# Patient Record
Sex: MALE | Race: WHITE | NOT HISPANIC OR LATINO | Employment: FULL TIME | URBAN - NONMETROPOLITAN AREA
[De-identification: names, ages, dates, MRNs, and addresses within clinical notes are randomized per-mention and may not be internally consistent; named-entity substitution may affect disease eponyms.]

---

## 2017-01-06 ENCOUNTER — OFFICE VISIT (OUTPATIENT)
Dept: FAMILY MEDICINE CLINIC | Facility: CLINIC | Age: 32
End: 2017-01-06

## 2017-01-06 VITALS
TEMPERATURE: 98.6 F | HEART RATE: 82 BPM | HEIGHT: 72 IN | WEIGHT: 160 LBS | SYSTOLIC BLOOD PRESSURE: 124 MMHG | OXYGEN SATURATION: 100 % | DIASTOLIC BLOOD PRESSURE: 79 MMHG | BODY MASS INDEX: 21.67 KG/M2

## 2017-01-06 DIAGNOSIS — F39 MOOD DISORDER (HCC): Primary | ICD-10-CM

## 2017-01-06 PROCEDURE — 99213 OFFICE O/P EST LOW 20 MIN: CPT | Performed by: FAMILY MEDICINE

## 2017-01-06 RX ORDER — ESCITALOPRAM OXALATE 10 MG/1
10 TABLET ORAL DAILY
Qty: 30 TABLET | Refills: 5 | Status: ON HOLD | OUTPATIENT
Start: 2017-01-06 | End: 2018-05-21

## 2017-01-06 NOTE — MR AVS SNAPSHOT
Nicholas Oviedo   1/6/2017 9:45 AM   Office Visit    Dept Phone:  [phone]   Encounter #:  [id number]    Provider:  Marlon العراقي DO   Department:  [facility] PRIMARY CARE                Your Full Care Plan              Today's Medication Changes          These changes are accurate as of: 1/6/17 10:30 AM.  If you have any questions, ask your nurse or doctor.               New Medication(s)Ordered:     escitalopram 10 MG tablet   Commonly known as:  LEXAPRO   Take 1 tablet by mouth Daily.   Started by:  Marlon العراقي DO         Stop taking medication(s)listed here:     buPROPion  MG 12 hr tablet   Commonly known as:  WELLBUTRIN SR   Stopped by:  Marlon العراقي DO           cefdinir 300 MG capsule   Commonly known as:  OMNICEF   Stopped by:  Marlon العراقي DO           dexamethasone 0.5 MG tablet   Commonly known as:  DECADRON   Stopped by:  Marlon العراقي DO                Where to Get Your Medications      These medications were sent to [place], [place] - [address] [facility] [phone] [facility] [phone]   [address] [facility] [address]     Phone:  [phone]     escitalopram 10 MG tablet                  Your Updated Medication List          This list is accurate as of: 1/6/17 10:30 AM.  Always use your most recent med list.                escitalopram 10 MG tablet   Commonly known as:  LEXAPRO   Take 1 tablet by mouth Daily.               You Were Diagnosed With        Codes Comments    Mood disorder    -  Primary ICD-10-CM: F39  ICD-9-CM: 296.90       Instructions     None    Patient Instructions History      Upcoming Appointments     Visit Type Date Time Department    OFFICE VISIT 1/6/2017  9:45 AM [facility]      [employer] Signup     [facility] allows you to send messages to your doctor, view your test results, renew your prescriptions, schedule appointments, and more. To sign up, go to  "[email or web address] and click on the Sign Up Now link in the New User? box. Enter your [employer] Activation Code exactly as it appears below along with the last four digits of your Social Security Number and your Date of Birth () to complete the sign-up process. If you do not sign up before the expiration date, you must request a new code.    [employer] Activation Code: [email or web address]  Expires: 2017 10:30 AM    If you have questions, you can email [email or web address] or call [phone] to talk to our [employer] staff. Remember, [employer] is NOT to be used for urgent needs. For medical emergencies, dial 911.               Other Info from Your Visit           Allergies     No Known Allergies      Reason for Visit     Anxiety follow up; wellbutrin did not work      Vital Signs     Blood Pressure Pulse Temperature Height Weight Oxygen Saturation    124/79 (BP Location: Right arm, Patient Position: Sitting) 82 98.6 °F (37 °C) 72\" (182.9 cm) 160 lb (72.6 kg) 100%    Body Mass Index Smoking Status                21.7 kg/m2 Never Smoker          Problems and Diagnoses Noted     Mood disorder      No Longer an Issue     Acute respiratory infection    Cough    Sore throat        "

## 2017-01-06 NOTE — PROGRESS NOTES
Subjective   Nicholas Oviedo is a 31 y.o. male.     Chief Complaint   Patient presents with   • Anxiety     follow up; wellbutrin did not work       History of Present Illness   PT here for sched f/u appt; states no sig improvement in symptoms of mood instability with las med prescribed; no adv effects of med; no SI/HI; continues to eat well; staying as active as he can, as he is currently laid off from work.  The following portions of the patient's history were reviewed and updated as appropriate: allergies, current medications, past family history, past medical history, past social history, past surgical history and problem list.    Review of Systems   Constitutional: Negative for activity change, appetite change, chills, diaphoresis, fatigue, fever and unexpected weight change.   HENT: Negative for congestion, dental problem, drooling, ear discharge, ear pain, facial swelling, hearing loss, mouth sores, nosebleeds, postnasal drip, rhinorrhea, sinus pressure, sneezing, sore throat, tinnitus, trouble swallowing and voice change.    Eyes: Negative for photophobia, pain, discharge, redness, itching and visual disturbance.   Respiratory: Negative for apnea, cough, choking, chest tightness, shortness of breath, wheezing and stridor.    Cardiovascular: Negative for chest pain, palpitations and leg swelling.   Gastrointestinal: Negative for abdominal distention, abdominal pain, anal bleeding, blood in stool, constipation, diarrhea, nausea, rectal pain and vomiting.   Endocrine: Negative for cold intolerance, heat intolerance, polydipsia, polyphagia and polyuria.   Genitourinary: Negative for decreased urine volume, difficulty urinating, dysuria, enuresis, flank pain, frequency, genital sores, hematuria and urgency.   Musculoskeletal: Negative for arthralgias, back pain, gait problem, joint swelling, myalgias, neck pain and neck stiffness.   Skin: Negative for color change, pallor, rash and wound.  "  Allergic/Immunologic: Negative for food allergies and immunocompromised state.   Neurological: Negative for dizziness, tremors, seizures, syncope, facial asymmetry, speech difficulty, weakness, light-headedness, numbness and headaches.   Hematological: Negative for adenopathy. Does not bruise/bleed easily.   Psychiatric/Behavioral: Negative for agitation, behavioral problems, confusion, decreased concentration, dysphoric mood, hallucinations, self-injury, sleep disturbance and suicidal ideas. The patient is not nervous/anxious and is not hyperactive.        Patient Active Problem List   Diagnosis   • Tobacco dependence   • Mood disorder       Current Outpatient Prescriptions on File Prior to Visit   Medication Sig Dispense Refill   • [DISCONTINUED] buPROPion SR (WELLBUTRIN SR) 100 MG 12 hr tablet Take 1 tablet by mouth 2 (Two) Times a Day. 60 tablet 3   • [DISCONTINUED] cefdinir (OMNICEF) 300 MG capsule Take 1 capsule by mouth 2 (Two) Times a Day. 20 capsule 0   • [DISCONTINUED] dexamethasone (DECADRON) 0.5 MG tablet Take 1 tablet by mouth 2 (Two) Times a Day With Meals. 21 tablet 0     No current facility-administered medications on file prior to visit.        Social History     Social History   • Marital status:      Spouse name: N/A   • Number of children: N/A   • Years of education: N/A     Occupational History   • Not on file.     Social History Main Topics   • Smoking status: Never Smoker   • Smokeless tobacco: Current User     Types: Chew   • Alcohol use No   • Drug use: No   • Sexual activity: Not on file     Other Topics Concern   • Not on file     Social History Narrative       Objective   Blood pressure 124/79, pulse 82, temperature 98.6 °F (37 °C), height 72\" (182.9 cm), weight 160 lb (72.6 kg), SpO2 100 %.     Physical Exam   Constitutional: He is oriented to person, place, and time. He appears well-developed and well-nourished. No distress.   HENT:   Head: Normocephalic and atraumatic.   Right " Ear: External ear normal.   Left Ear: External ear normal.   Nose: Nose normal.   Mouth/Throat: Oropharynx is clear and moist. No oropharyngeal exudate.   Eyes: Conjunctivae and EOM are normal. Pupils are equal, round, and reactive to light. Right eye exhibits no discharge. Left eye exhibits no discharge. No scleral icterus.   Neck: Normal range of motion. Neck supple. No JVD present. No tracheal deviation present. No thyromegaly present.   Cardiovascular: Normal rate, normal heart sounds and intact distal pulses.  Exam reveals no gallop and no friction rub.    No murmur heard.  Pulmonary/Chest: Effort normal and breath sounds normal. No stridor. No respiratory distress. He has no wheezes. He has no rales. He exhibits no tenderness.   Abdominal: Soft. Bowel sounds are normal. He exhibits no distension and no mass. There is no tenderness. There is no rebound and no guarding. No hernia.   Genitourinary:   Genitourinary Comments: Pt defers   Musculoskeletal: Normal range of motion. He exhibits no edema, tenderness or deformity.   Lymphadenopathy:     He has no cervical adenopathy.   Neurological: He is alert and oriented to person, place, and time. He has normal reflexes. He displays normal reflexes. No cranial nerve deficit. He exhibits normal muscle tone. Coordination normal.   Skin: Skin is warm. No rash noted. He is not diaphoretic. No erythema. No pallor.   Psychiatric: He has a normal mood and affect. His behavior is normal. Judgment and thought content normal.   Nursing note and vitals reviewed.      Results for orders placed or performed in visit on 11/02/16   POC Rapid Strep A   Result Value Ref Range    Rapid Strep A Screen Negative Negative, VALID, INVALID, Not Performed    Internal Control Passed Passed    Lot Number [id number]     Expiration Date 06/01/2018    POC Influenza A / B   Result Value Ref Range    Rapid Influenza A Ag negative     Rapid Influenza B Ag Negative     Internal Control Passed Passed     Lot Number [id number]     Expiration Date 04/01/2017        Assessment/Plan   Problems Addressed this Visit        Other    Mood disorder - Primary    Relevant Medications    escitalopram (LEXAPRO) 10 MG tablet               Discussion/Summary:  Discussed plan of care in detail with pt today; pt verb understanding and agrees; counseled for approx 15 min of total 25 min exam time.    Discussed need for stress/anxiety reducing techniques such as prayer/meditation/breathing and counting exercises and avoidance of stress producing environments/situations; will follow clinically.    There are no Patient Instructions on file for this visit.

## 2017-01-19 DIAGNOSIS — K64.8 OTHER HEMORRHOIDS: Primary | ICD-10-CM

## 2017-02-13 ENCOUNTER — OFFICE VISIT (OUTPATIENT)
Dept: FAMILY MEDICINE CLINIC | Facility: CLINIC | Age: 32
End: 2017-02-13

## 2017-02-13 ENCOUNTER — HOSPITAL ENCOUNTER (EMERGENCY)
Facility: HOSPITAL | Age: 32
Discharge: LEFT BEFORE DISCHARGE | End: 2017-02-13

## 2017-02-13 VITALS
HEART RATE: 96 BPM | WEIGHT: 155 LBS | BODY MASS INDEX: 20.99 KG/M2 | SYSTOLIC BLOOD PRESSURE: 141 MMHG | HEIGHT: 72 IN | OXYGEN SATURATION: 100 % | DIASTOLIC BLOOD PRESSURE: 76 MMHG | RESPIRATION RATE: 18 BRPM | TEMPERATURE: 99.1 F

## 2017-02-13 VITALS
HEART RATE: 71 BPM | SYSTOLIC BLOOD PRESSURE: 143 MMHG | TEMPERATURE: 99.6 F | BODY MASS INDEX: 21.94 KG/M2 | HEIGHT: 72 IN | OXYGEN SATURATION: 100 % | WEIGHT: 162 LBS | DIASTOLIC BLOOD PRESSURE: 92 MMHG

## 2017-02-13 DIAGNOSIS — J22 ACUTE RESPIRATORY INFECTION: ICD-10-CM

## 2017-02-13 DIAGNOSIS — R05.9 COUGH: Primary | ICD-10-CM

## 2017-02-13 LAB
EXPIRATION DATE: NORMAL
FLUAV AG NPH QL: NORMAL
FLUBV AG NPH QL: NORMAL
INTERNAL CONTROL: NORMAL
Lab: NORMAL

## 2017-02-13 PROCEDURE — 87804 INFLUENZA ASSAY W/OPTIC: CPT | Performed by: FAMILY MEDICINE

## 2017-02-13 PROCEDURE — 96372 THER/PROPH/DIAG INJ SC/IM: CPT | Performed by: FAMILY MEDICINE

## 2017-02-13 PROCEDURE — 99213 OFFICE O/P EST LOW 20 MIN: CPT | Performed by: FAMILY MEDICINE

## 2017-02-13 RX ORDER — CEFDINIR 300 MG/1
300 CAPSULE ORAL 2 TIMES DAILY
Qty: 14 CAPSULE | Refills: 0 | Status: ON HOLD | OUTPATIENT
Start: 2017-02-13 | End: 2018-05-21

## 2017-02-13 RX ORDER — DEXAMETHASONE 1 MG
TABLET ORAL
Qty: 21 TABLET | Refills: 0 | Status: ON HOLD | OUTPATIENT
Start: 2017-02-13 | End: 2018-05-21

## 2017-02-13 RX ORDER — CEFTRIAXONE 1 G/1
1 INJECTION, POWDER, FOR SOLUTION INTRAMUSCULAR; INTRAVENOUS EVERY 24 HOURS
Status: DISCONTINUED | OUTPATIENT
Start: 2017-02-13 | End: 2018-05-21

## 2017-02-13 RX ORDER — METHYLPREDNISOLONE ACETATE 80 MG/ML
80 INJECTION, SUSPENSION INTRA-ARTICULAR; INTRALESIONAL; INTRAMUSCULAR; SOFT TISSUE ONCE
Status: COMPLETED | OUTPATIENT
Start: 2017-02-13 | End: 2017-02-13

## 2017-02-13 RX ADMIN — METHYLPREDNISOLONE ACETATE 80 MG: 80 INJECTION, SUSPENSION INTRA-ARTICULAR; INTRALESIONAL; INTRAMUSCULAR; SOFT TISSUE at 15:40

## 2017-02-13 RX ADMIN — CEFTRIAXONE 1 G: 1 INJECTION, POWDER, FOR SOLUTION INTRAMUSCULAR; INTRAVENOUS at 15:39

## 2017-02-13 NOTE — PROGRESS NOTES
Subjective   Nicholas Oviedo is a 31 y.o. male.     Chief Complaint   Patient presents with   • Cough     fever, and chest burning       History of Present Illness   Pt here for eval of chest congestion and Fever at home; generalized malaise and fatigue; no hemoptysis; no rashes; no N/V/D; heavy phlegm prod; gen achiness noted also.  The following portions of the patient's history were reviewed and updated as appropriate: allergies, current medications, past family history, past medical history, past social history, past surgical history and problem list.    Review of Systems   Constitutional: Negative for activity change, appetite change, chills, diaphoresis, fatigue, fever and unexpected weight change.   HENT: Negative for congestion, dental problem, drooling, ear discharge, ear pain, facial swelling, hearing loss, mouth sores, nosebleeds, postnasal drip, rhinorrhea, sinus pressure, sneezing, sore throat, tinnitus, trouble swallowing and voice change.    Eyes: Negative for photophobia, pain, discharge, redness, itching and visual disturbance.   Respiratory: Negative for apnea, cough, choking, chest tightness, shortness of breath, wheezing and stridor.    Cardiovascular: Negative for chest pain, palpitations and leg swelling.   Gastrointestinal: Negative for abdominal distention, abdominal pain, anal bleeding, blood in stool, constipation, diarrhea, nausea, rectal pain and vomiting.   Endocrine: Negative for cold intolerance, heat intolerance, polydipsia, polyphagia and polyuria.   Genitourinary: Negative for decreased urine volume, difficulty urinating, dysuria, enuresis, flank pain, frequency, genital sores, hematuria and urgency.   Musculoskeletal: Negative for arthralgias, back pain, gait problem, joint swelling, myalgias, neck pain and neck stiffness.   Skin: Negative for color change, pallor, rash and wound.   Allergic/Immunologic: Negative for food allergies and immunocompromised state.   Neurological:  "Negative for dizziness, tremors, seizures, syncope, facial asymmetry, speech difficulty, weakness, light-headedness, numbness and headaches.   Hematological: Negative for adenopathy. Does not bruise/bleed easily.   Psychiatric/Behavioral: Negative for agitation, behavioral problems, confusion, decreased concentration, dysphoric mood, hallucinations, self-injury, sleep disturbance and suicidal ideas. The patient is not nervous/anxious and is not hyperactive.        Patient Active Problem List   Diagnosis   • Tobacco dependence   • Mood disorder       Current Outpatient Prescriptions on File Prior to Visit   Medication Sig Dispense Refill   • escitalopram (LEXAPRO) 10 MG tablet Take 1 tablet by mouth Daily. 30 tablet 5   • hydrocortisone 2.5 % ointment Apply  topically 2 (Two) Times a Day. 20 g 3     No current facility-administered medications on file prior to visit.        Social History     Social History   • Marital status:      Spouse name: N/A   • Number of children: N/A   • Years of education: N/A     Occupational History   • Not on file.     Social History Main Topics   • Smoking status: Never Smoker   • Smokeless tobacco: Current User     Types: Chew   • Alcohol use No   • Drug use: No   • Sexual activity: Defer     Other Topics Concern   • Not on file     Social History Narrative       Objective   Blood pressure 143/92, pulse 71, temperature 99.6 °F (37.6 °C), height 72\" (182.9 cm), weight 162 lb (73.5 kg), SpO2 100 %.     Physical Exam   Constitutional: He is oriented to person, place, and time. He appears well-developed and well-nourished. No distress.   Mod ill-appearing male; NAD/AOx4   HENT:   Head: Normocephalic and atraumatic.   Right Ear: External ear normal.   Left Ear: External ear normal.   Nose: Nose normal.   Mouth/Throat: Oropharynx is clear and moist. No oropharyngeal exudate.   Eyes: Conjunctivae and EOM are normal. Pupils are equal, round, and reactive to light. Right eye exhibits no " discharge. Left eye exhibits no discharge. No scleral icterus.   Neck: Normal range of motion. Neck supple. No JVD present. No tracheal deviation present. No thyromegaly present.   Cardiovascular: Normal rate, normal heart sounds and intact distal pulses.  Exam reveals no gallop and no friction rub.    No murmur heard.  Pulmonary/Chest: Effort normal. No stridor. No respiratory distress. He has no wheezes. He has no rales. He exhibits no tenderness.   rhonchous BS to L lung; AEA noted to all lung fields.   Abdominal: Soft. Bowel sounds are normal. He exhibits no distension and no mass. There is no tenderness. There is no rebound and no guarding. No hernia.   Genitourinary:   Genitourinary Comments: Pt defers   Musculoskeletal: Normal range of motion. He exhibits no edema, tenderness or deformity.   Lymphadenopathy:     He has no cervical adenopathy.   Neurological: He is alert and oriented to person, place, and time. He has normal reflexes. He displays normal reflexes. No cranial nerve deficit. He exhibits normal muscle tone. Coordination normal.   Skin: Skin is warm. No rash noted. He is not diaphoretic. No erythema. No pallor.   Psychiatric: He has a normal mood and affect. His behavior is normal. Judgment and thought content normal.   Nursing note and vitals reviewed.      Results for orders placed or performed in visit on 02/13/17   POCT Influenza A/B   Result Value Ref Range    Rapid Influenza A Ag neg     Rapid Influenza B Ag neg     Internal Control Passed Passed    Lot Number [id number]     Expiration Date 06/01/2018        Assessment/Plan   Problems Addressed this Visit     None      Visit Diagnoses     Cough    -  Primary    Relevant Medications    methylPREDNISolone acetate (DEPO-medrol) injection 80 mg (Completed)    cefdinir (OMNICEF) 300 MG capsule    dexamethasone (DECADRON) 1 MG tablet    Other Relevant Orders    POCT Influenza A/B (Completed)    XR Chest PA & Lateral    Acute respiratory infection         Relevant Medications    cefTRIAXone (ROCEPHIN) injection 1 g    methylPREDNISolone acetate (DEPO-medrol) injection 80 mg (Completed)    cefdinir (OMNICEF) 300 MG capsule    dexamethasone (DECADRON) 1 MG tablet    Other Relevant Orders    XR Chest PA & Lateral               Discussion/Summary:  Discussed plan of care in detail with pt today; pt verb understanding and agrees; counseled for approx 10 min of total 15 min exam time.    I have advised for time off from work; written excuse given today.    There are no Patient Instructions on file for this visit.

## 2017-05-26 ENCOUNTER — OFFICE VISIT (OUTPATIENT)
Dept: FAMILY MEDICINE CLINIC | Facility: CLINIC | Age: 32
End: 2017-05-26

## 2017-05-26 VITALS
RESPIRATION RATE: 16 BRPM | BODY MASS INDEX: 22.62 KG/M2 | DIASTOLIC BLOOD PRESSURE: 72 MMHG | SYSTOLIC BLOOD PRESSURE: 113 MMHG | HEIGHT: 72 IN | HEART RATE: 67 BPM | OXYGEN SATURATION: 100 % | TEMPERATURE: 98.1 F | WEIGHT: 167 LBS

## 2017-05-26 DIAGNOSIS — R53.83 FATIGUE, UNSPECIFIED TYPE: Primary | ICD-10-CM

## 2017-05-26 DIAGNOSIS — Z00.00 ROUTINE GENERAL MEDICAL EXAMINATION AT A HEALTH CARE FACILITY: ICD-10-CM

## 2017-05-26 DIAGNOSIS — F39 MOOD DISORDER (HCC): ICD-10-CM

## 2017-05-26 DIAGNOSIS — Z72.0 TOBACCO ABUSE: ICD-10-CM

## 2017-05-26 PROCEDURE — 99213 OFFICE O/P EST LOW 20 MIN: CPT | Performed by: INTERNAL MEDICINE

## 2017-05-26 RX ORDER — VARENICLINE TARTRATE 1 MG/1
1 TABLET, FILM COATED ORAL 2 TIMES DAILY
Qty: 60 TABLET | Refills: 1 | Status: ON HOLD | OUTPATIENT
Start: 2017-05-26 | End: 2018-05-21

## 2018-04-02 ENCOUNTER — HOSPITAL ENCOUNTER (OUTPATIENT)
Dept: OTHER | Age: 33
Discharge: HOME | End: 2018-04-02
Attending: NURSE PRACTITIONER | Admitting: NURSE PRACTITIONER

## 2018-04-02 DIAGNOSIS — M25.512 ACUTE PAIN OF LEFT SHOULDER: ICD-10-CM

## 2018-05-20 ENCOUNTER — HOSPITAL ENCOUNTER (EMERGENCY)
Facility: HOSPITAL | Age: 33
Discharge: PSYCHIATRIC OR SUBSTANCE USE FACILITY | End: 2018-05-21
Attending: STUDENT IN AN ORGANIZED HEALTH CARE EDUCATION/TRAINING PROGRAM | Admitting: EMERGENCY MEDICINE

## 2018-05-20 DIAGNOSIS — T14.91XA SUICIDAL BEHAVIOR WITH ATTEMPTED SELF-INJURY (HCC): Primary | ICD-10-CM

## 2018-05-20 LAB
ALBUMIN SERPL-MCNC: 3.9 G/DL (ref 3.5–5)
ALBUMIN/GLOB SERPL: 1.2 G/DL (ref 1–2)
ALP SERPL-CCNC: 74 U/L (ref 38–126)
ALT SERPL W P-5'-P-CCNC: 22 U/L (ref 13–69)
AMPHET+METHAMPHET UR QL: NEGATIVE
AMPHETAMINES UR QL: NEGATIVE
ANION GAP SERPL CALCULATED.3IONS-SCNC: 13.4 MMOL/L (ref 10–20)
APAP SERPL-MCNC: <10 MCG/ML
AST SERPL-CCNC: 20 U/L (ref 15–46)
BARBITURATES UR QL SCN: NEGATIVE
BASOPHILS # BLD AUTO: 0.02 10*3/MM3 (ref 0–0.2)
BASOPHILS NFR BLD AUTO: 0.2 % (ref 0–2.5)
BENZODIAZ UR QL SCN: NEGATIVE
BILIRUB SERPL-MCNC: 0.3 MG/DL (ref 0.2–1.3)
BILIRUB UR QL STRIP: NEGATIVE
BUN BLD-MCNC: 11 MG/DL (ref 7–20)
BUN/CREAT SERPL: 11 (ref 6.3–21.9)
BUPRENORPHINE SERPL-MCNC: NEGATIVE NG/ML
CALCIUM SPEC-SCNC: 8.6 MG/DL (ref 8.4–10.2)
CANNABINOIDS SERPL QL: NEGATIVE
CHLORIDE SERPL-SCNC: 101 MMOL/L (ref 98–107)
CLARITY UR: ABNORMAL
CO2 SERPL-SCNC: 28 MMOL/L (ref 26–30)
COCAINE UR QL: NEGATIVE
COLOR UR: YELLOW
CREAT BLD-MCNC: 1 MG/DL (ref 0.6–1.3)
DEPRECATED RDW RBC AUTO: 38.4 FL (ref 37–54)
EOSINOPHIL # BLD AUTO: 0.03 10*3/MM3 (ref 0–0.7)
EOSINOPHIL NFR BLD AUTO: 0.3 % (ref 0–7)
ERYTHROCYTE [DISTWIDTH] IN BLOOD BY AUTOMATED COUNT: 12.8 % (ref 11.5–14.5)
ETHANOL BLD-MCNC: <10 MG/DL
ETHANOL UR QL: <0.01 %
GFR SERPL CREATININE-BSD FRML MDRD: 87 ML/MIN/1.73
GLOBULIN UR ELPH-MCNC: 3.3 GM/DL
GLUCOSE BLD-MCNC: 107 MG/DL (ref 74–98)
GLUCOSE UR STRIP-MCNC: NEGATIVE MG/DL
HCT VFR BLD AUTO: 33.1 % (ref 42–52)
HGB BLD-MCNC: 11 G/DL (ref 14–18)
HGB UR QL STRIP.AUTO: NEGATIVE
IMM GRANULOCYTES # BLD: 0.04 10*3/MM3 (ref 0–0.06)
IMM GRANULOCYTES NFR BLD: 0.4 % (ref 0–0.6)
KETONES UR QL STRIP: NEGATIVE
LEUKOCYTE ESTERASE UR QL STRIP.AUTO: NEGATIVE
LYMPHOCYTES # BLD AUTO: 1.63 10*3/MM3 (ref 0.6–3.4)
LYMPHOCYTES NFR BLD AUTO: 16 % (ref 10–50)
MCH RBC QN AUTO: 27.1 PG (ref 27–31)
MCHC RBC AUTO-ENTMCNC: 33.2 G/DL (ref 30–37)
MCV RBC AUTO: 81.5 FL (ref 80–94)
METHADONE UR QL SCN: NEGATIVE
MONOCYTES # BLD AUTO: 0.74 10*3/MM3 (ref 0–0.9)
MONOCYTES NFR BLD AUTO: 7.3 % (ref 0–12)
NEUTROPHILS # BLD AUTO: 7.73 10*3/MM3 (ref 2–6.9)
NEUTROPHILS NFR BLD AUTO: 75.8 % (ref 37–80)
NITRITE UR QL STRIP: NEGATIVE
NRBC BLD MANUAL-RTO: 0 /100 WBC (ref 0–0)
OPIATES UR QL: NEGATIVE
OXYCODONE UR QL SCN: NEGATIVE
PCP UR QL SCN: NEGATIVE
PH UR STRIP.AUTO: 6.5 [PH] (ref 5–8)
PLATELET # BLD AUTO: 335 10*3/MM3 (ref 130–400)
PMV BLD AUTO: 9.3 FL (ref 6–12)
POTASSIUM BLD-SCNC: 3.4 MMOL/L (ref 3.5–5.1)
PROPOXYPH UR QL: NEGATIVE
PROT SERPL-MCNC: 7.2 G/DL (ref 6.3–8.2)
PROT UR QL STRIP: NEGATIVE
RBC # BLD AUTO: 4.06 10*6/MM3 (ref 4.7–6.1)
SALICYLATES SERPL-MCNC: <1 MG/DL (ref 2.8–20)
SODIUM BLD-SCNC: 139 MMOL/L (ref 137–145)
SP GR UR STRIP: 1.01 (ref 1–1.03)
TRICYCLICS UR QL SCN: NEGATIVE
UROBILINOGEN UR QL STRIP: ABNORMAL
WBC NRBC COR # BLD: 10.19 10*3/MM3 (ref 4.8–10.8)

## 2018-05-20 PROCEDURE — 80307 DRUG TEST PRSMV CHEM ANLYZR: CPT | Performed by: NURSE PRACTITIONER

## 2018-05-20 PROCEDURE — 81003 URINALYSIS AUTO W/O SCOPE: CPT | Performed by: NURSE PRACTITIONER

## 2018-05-20 PROCEDURE — 80306 DRUG TEST PRSMV INSTRMNT: CPT | Performed by: NURSE PRACTITIONER

## 2018-05-20 PROCEDURE — 99285 EMERGENCY DEPT VISIT HI MDM: CPT

## 2018-05-20 PROCEDURE — 80053 COMPREHEN METABOLIC PANEL: CPT | Performed by: NURSE PRACTITIONER

## 2018-05-20 PROCEDURE — 36415 COLL VENOUS BLD VENIPUNCTURE: CPT

## 2018-05-20 PROCEDURE — 93005 ELECTROCARDIOGRAM TRACING: CPT | Performed by: NURSE PRACTITIONER

## 2018-05-20 PROCEDURE — 85025 COMPLETE CBC W/AUTO DIFF WBC: CPT | Performed by: NURSE PRACTITIONER

## 2018-05-20 RX ORDER — FLUOXETINE HYDROCHLORIDE 20 MG/1
20 CAPSULE ORAL DAILY
COMMUNITY

## 2018-05-20 RX ORDER — SODIUM CHLORIDE 0.9 % (FLUSH) 0.9 %
10 SYRINGE (ML) INJECTION AS NEEDED
Status: DISCONTINUED | OUTPATIENT
Start: 2018-05-20 | End: 2018-05-21 | Stop reason: HOSPADM
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
## 2018-05-21 ENCOUNTER — HOSPITAL ENCOUNTER (INPATIENT)
Facility: HOSPITAL | Age: 33
LOS: 1 days | Discharge: HOME | End: 2018-05-22
Attending: PSYCHIATRY & NEUROLOGY | Admitting: PSYCHIATRY & NEUROLOGY

## 2018-05-21 VITALS
TEMPERATURE: 98.8 F | WEIGHT: 164 LBS | HEART RATE: 55 BPM | SYSTOLIC BLOOD PRESSURE: 123 MMHG | BODY MASS INDEX: 22.21 KG/M2 | RESPIRATION RATE: 18 BRPM | HEIGHT: 72 IN | OXYGEN SATURATION: 97 % | DIASTOLIC BLOOD PRESSURE: 81 MMHG

## 2018-05-21 PROBLEM — R45.851 SUICIDAL IDEATION: Status: ACTIVE | Noted: 2018-05-21

## 2018-05-21 LAB — POTASSIUM BLD-SCNC: 4.1 MMOL/L (ref 3.5–5.3)

## 2018-05-21 PROCEDURE — 84132 ASSAY OF SERUM POTASSIUM: CPT | Performed by: PSYCHIATRY & NEUROLOGY

## 2018-05-21 RX ORDER — FLUOXETINE HYDROCHLORIDE 20 MG/1
20 CAPSULE ORAL DAILY
Status: DISCONTINUED | OUTPATIENT
Start: 2018-05-21 | End: 2018-05-22 | Stop reason: HOSPADM

## 2018-05-21 RX ORDER — HYDROXYZINE 50 MG/1
50 TABLET, FILM COATED ORAL EVERY 6 HOURS PRN
Status: DISCONTINUED | OUTPATIENT
Start: 2018-05-21 | End: 2018-05-22 | Stop reason: HOSPADM

## 2018-05-21 RX ORDER — NICOTINE 21 MG/24HR
1 PATCH, TRANSDERMAL 24 HOURS TRANSDERMAL EVERY 24 HOURS
Status: DISCONTINUED | OUTPATIENT
Start: 2018-05-21 | End: 2018-05-22 | Stop reason: HOSPADM

## 2018-05-21 RX ORDER — ALUMINA, MAGNESIA, AND SIMETHICONE 2400; 2400; 240 MG/30ML; MG/30ML; MG/30ML
15 SUSPENSION ORAL EVERY 6 HOURS PRN
Status: DISCONTINUED | OUTPATIENT
Start: 2018-05-21 | End: 2018-05-22 | Stop reason: HOSPADM

## 2018-05-21 RX ORDER — MIRTAZAPINE 15 MG/1
7.5 TABLET, FILM COATED ORAL NIGHTLY
Status: DISCONTINUED | OUTPATIENT
Start: 2018-05-21 | End: 2018-05-22 | Stop reason: HOSPADM

## 2018-05-21 RX ORDER — TRAZODONE HYDROCHLORIDE 50 MG/1
100 TABLET ORAL NIGHTLY PRN
Status: DISCONTINUED | OUTPATIENT
Start: 2018-05-21 | End: 2018-05-21

## 2018-05-21 RX ORDER — IBUPROFEN 600 MG/1
600 TABLET ORAL EVERY 6 HOURS PRN
Status: DISCONTINUED | OUTPATIENT
Start: 2018-05-21 | End: 2018-05-22 | Stop reason: HOSPADM

## 2018-05-21 RX ORDER — FLUOXETINE HYDROCHLORIDE 20 MG/1
20 CAPSULE ORAL DAILY
Status: CANCELLED | OUTPATIENT
Start: 2018-05-21

## 2018-05-21 RX ADMIN — FLUOXETINE 20 MG: 20 CAPSULE ORAL at 14:12

## 2018-05-21 RX ADMIN — NICOTINE 1 PATCH: 21 PATCH TRANSDERMAL at 08:11

## 2018-05-21 RX ADMIN — MIRTAZAPINE 7.5 MG: 15 TABLET, FILM COATED ORAL at 20:36

## 2018-05-21 NOTE — ED NOTES
Jil, Lead Rn provided alternative phone number for [employer] [phone] which I called and again was unsuccessful at reaching anyone.     Luzma Michelle, NATHANIEL  05/21/18 0043

## 2018-05-21 NOTE — ED NOTES
Met with patient again at bedside, is voluntary and agreeable for admission.  Appears goal directed.  Reports wife agreed to pick him up upon his discharge from [facility].  Info provided to Tosin STONE RN at [facility] to call report.  I attempted to call [employer] x 6 times periodically throughout the last 30 minutes with no answer, and the last 2 times the phone line was off.    -NATHANIEL Clemente, [facility]  05/21/18 0029       Luzma Michelle, [facility]  05/21/18 0030
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
## 2018-05-21 NOTE — PLAN OF CARE
Problem: Mood Impairment (Anxiety Signs/Symptoms) (Adult)  Goal: Improved Mood Symptoms (Anxiety Signs/Symptoms)  Outcome: Ongoing (interventions implemented as appropriate)      Problem: Social/Occupational/Functional Impairment (Anxiety Signs/Symptoms) (Adult)  Goal: Improved Social/Occupational/Functional Skills (Anxiety Signs/Symptoms)  Outcome: Ongoing (interventions implemented as appropriate)      Problem: Mood Impairment (Depressive Signs/Symptoms) (Adult)  Goal: Improved Mood Symptoms (Depressive Signs/Symptoms)  Outcome: Ongoing (interventions implemented as appropriate)      Problem: Feelings of Worthlessness, Hopelessness, Excessive Guilt (Depressive Signs/Symptoms) (Adult)  Goal: Enhanced Self-Esteem/Confidence (Depressive Signs/Symptoms)  Outcome: Ongoing (interventions implemented as appropriate)      Problem: Sleep Impairment (Depressive Signs/Symptoms) (Adult)  Goal: Improved Sleep Hygiene (Depressive Signs/Symptoms)  Outcome: Ongoing (interventions implemented as appropriate)      Problem: Social/Occupational/Functional Impairment (Depressive Signs/Symptoms) (Adult)  Goal: Improved Social/Occupational/Functional Skills (Depressive Signs/Symptoms)  Outcome: Ongoing (interventions implemented as appropriate)      Problem: Suicidal Behavior (Adult)  Goal: Suicidal Behavior is Absent/Minimized/Managed  Outcome: Ongoing (interventions implemented as appropriate)

## 2018-05-21 NOTE — PLAN OF CARE
Problem: Patient Care Overview  Goal: Plan of Care Review  Outcome: Ongoing (interventions implemented as appropriate)   05/21/18 6905   Coping/Psychosocial   Plan of Care Reviewed With patient   Coping/Psychosocial   Patient Agreement with Plan of Care agrees   Plan of Care Review   Progress improving   OTHER   Outcome Summary PT REPORTS DEPRESSION 8/10. DENIES ANY ANXIETY, SI, HI, OR A/V HALLUCINATIONS. PT IS CALM AND COOPERATIVE. WITHDRAWAN, DOESNT INTERACT WITH OTHERS.        Problem: Overarching Goals (Adult)  Goal: Adheres to Safety Considerations for Self and Others  Outcome: Ongoing (interventions implemented as appropriate)    Goal: Optimized Coping Skills in Response to Life Stressors  Outcome: Ongoing (interventions implemented as appropriate)

## 2018-05-22 VITALS
HEIGHT: 72 IN | RESPIRATION RATE: 18 BRPM | HEART RATE: 78 BPM | WEIGHT: 164.2 LBS | DIASTOLIC BLOOD PRESSURE: 65 MMHG | OXYGEN SATURATION: 99 % | TEMPERATURE: 99.1 F | SYSTOLIC BLOOD PRESSURE: 103 MMHG | BODY MASS INDEX: 22.24 KG/M2

## 2018-05-22 RX ORDER — MIRTAZAPINE 7.5 MG/1
7.5 TABLET, FILM COATED ORAL NIGHTLY
Qty: 30 TABLET | Refills: 0 | Status: SHIPPED | OUTPATIENT
Start: 2018-05-22 | End: 2018-06-21

## 2018-05-22 RX ADMIN — IBUPROFEN 600 MG: 600 TABLET ORAL at 12:07

## 2018-05-22 RX ADMIN — FLUOXETINE 20 MG: 20 CAPSULE ORAL at 10:15

## 2018-05-22 RX ADMIN — NICOTINE 1 PATCH: 21 PATCH TRANSDERMAL at 10:15

## 2018-05-22 NOTE — PROGRESS NOTES
1020:       DATA:      Therapist met individually with patient this date. Reintroduced self to patient from initial assessment began yesterday.  Discussed progress in treatment and any needs/concerns.     Patient reports that he is excited to return home today and feels much better.  Therapist conducted telephone family session with patient and his spouse Lorie yesterday. She was agreeable to patient returning home and expressed interest in the couple working on their marriage.  Therapist conducted safety planning for firearm and spouse reports that all firearms are removed from home.     Assisted patient in identifying risk factors which would indicate the need for higher level of care including thoughts to harm self or others and/or self-harming behavior and encouraged patient to contact this office, call 911, or present to the nearest emergency room should any of these events occur. Discussed crisis intervention services and means to access.  Patient adamantly and convincingly denies current suicidal or homicidal ideation or perceptual disturbance.      ASSESSMENT:     Patient observed to have appropriate affect and congruent mood. Patient denies SI/HI.  Patient is calm and oriented. Patient denies acute symptoms.  Patient has expressed interest in seeing psychiatrist at [facility] for medication management.  Patient appears motivated to work on the issues that brought him to the hospital.  He identifies his children as protective factors this date.     Plan:    Patient requesting discharge. Patient has been scheduled with [facility] for 5/23/18. Patient's spouse to transport him home.  Home reported to be safe guarded.

## 2018-05-22 NOTE — DISCHARGE SUMMARY
Date of Discharge:  5/22/2018    Presenting Problem/History of Present Illness  Suicidal ideation [R45.851]   Interval history   patient was seen today, he reports doing well, denies depression or anxiety, has slept well, denies craving for any drugs or alcohol, denies any withdrawal symptoms.  Denies suicidal thoughts homicidal thoughts hallucinations or paranoia.  He is well oriented,  therapist has talked with.Patient's wife who has agreed for patient to return home.  We will discharge patient today.  He is agreeable to follow-up at [facility] in [facility] [place], appointment on 5/23/2018    Hospital Course  Patient hospitalized on 5/21/2018 He was referred by [facility] in [facility] where he had presented with history of a suicidal attempt after he was in an argument with his wife when he acknowledges infidelity to her.  He was placed on special precautions level III, I saw him on 5/21/2018 minute did the initial history and physical examination, started him on Prozac 20 mg daily and Remeron 7.5 mg at bedtime.  Patient was seen again on 5/22/2018 when he reported doing well as described above, he was discharged on 5/22/18.    Procedures Performed         Consults:   Consults     No orders found for last 30 day(s).          Pertinent Test Results: Results Review:CMP is within normal limits.  CBC has shown low hemoglobin at 11.0 hematocrit 33.1   Urinalysis is negative            Urine drug screen is negative  EKG has shown sinus rhythm    Condition on Discharge: Improved and stable      Home or Self Care    Discharge Medications   Nicholas Oviedo   Home Medication Instructions ZAID:[id number]    Printed on:05/22/18 1226   Medication Information                      FLUoxetine (PROzac) 20 MG capsule  Take 20 mg by mouth Daily.             mirtazapine (REMERON) 7.5 MG tablet  Take 1 tablet by mouth Every Night for 30 days.                 Lab Results (last 24 hours)     ** No results found  for the last 24 hours. **        Follow-up Appointments  Future Appointments  Date Time Provider Department Center   5/23/2018 1:45 PM Floresita Ramon [facility]YS[facility] None         Discharge Diagnosis: Assessment/Diagnosis: Adjustment disorder with depressed mood  Rule out major depression single episode without psychosis  Alcohol abuse                  Audrey Starr MD  05/22/18  12:26 PM

## 2018-05-22 NOTE — PLAN OF CARE
"Problem: Patient Care Overview  Goal: Plan of Care Review  Outcome: Ongoing (interventions implemented as appropriate)   05/22/18 0535   Coping/Psychosocial   Plan of Care Reviewed With patient   Coping/Psychosocial   Patient Agreement with Plan of Care agrees   Plan of Care Review   Progress no change   OTHER   Outcome Summary PT REPORTED ANXIETY & DEPRESSION BOTH 2, DENIED SI/HI/HALLUCINATIONS. 'EXCITED ABOUT GOING HOME TODAY\". MORE TALKATIVE THIS SHIFT.       Problem: Overarching Goals (Adult)  Goal: Adheres to Safety Considerations for Self and Others  Outcome: Ongoing (interventions implemented as appropriate)    Goal: Optimized Coping Skills in Response to Life Stressors  Outcome: Ongoing (interventions implemented as appropriate)    Goal: Develops/Participates in Therapeutic [place] to Support Successful Transition  Outcome: Ongoing (interventions implemented as appropriate)        "

## 2018-05-23 ENCOUNTER — OFFICE VISIT (OUTPATIENT)
Dept: PSYCHIATRY | Facility: CLINIC | Age: 33
End: 2018-05-23

## 2018-05-23 DIAGNOSIS — F33.2 SEVERE EPISODE OF RECURRENT MAJOR DEPRESSIVE DISORDER, WITHOUT PSYCHOTIC FEATURES (HCC): Primary | ICD-10-CM

## 2018-05-23 PROCEDURE — 90832 PSYTX W PT 30 MINUTES: CPT | Performed by: COUNSELOR
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
## 2018-05-29 ENCOUNTER — OFFICE VISIT (OUTPATIENT)
Dept: PSYCHIATRY | Facility: CLINIC | Age: 33
End: 2018-05-29

## 2018-05-29 DIAGNOSIS — F33.2 SEVERE EPISODE OF RECURRENT MAJOR DEPRESSIVE DISORDER, WITHOUT PSYCHOTIC FEATURES (HCC): Primary | ICD-10-CM

## 2018-05-29 PROCEDURE — 90834 PSYTX W PT 45 MINUTES: CPT | Performed by: COUNSELOR
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
## 2018-06-05 ENCOUNTER — OFFICE VISIT (OUTPATIENT)
Dept: PSYCHIATRY | Facility: CLINIC | Age: 33
End: 2018-06-05

## 2018-06-05 DIAGNOSIS — F33.2 SEVERE EPISODE OF RECURRENT MAJOR DEPRESSIVE DISORDER, WITHOUT PSYCHOTIC FEATURES (HCC): Primary | ICD-10-CM

## 2018-06-05 PROCEDURE — 90834 PSYTX W PT 45 MINUTES: CPT | Performed by: COUNSELOR

## 2018-06-06 NOTE — TREATMENT PLAN
Multi-Disciplinary Problems (from Behavioral Health Treatment Plan)    Active Problems     Problem: Anger  Start Date: 06/06/18    Problem Details:  The patient self-scales this problem as a 8 with 10 being the worst.       Goal Priority Start Date Expected End Date End Date    Patient will develop specific, socially acceptable way to manage anger. -- 06/06/18 -- --    Goal Details:  Progress toward goal:  Not appropriate to rate progress toward goal since this is the initial treatment plan.       Goal Intervention Frequency Start Date End Date    Process patient's angry feelings or outbursts that have recently occurred and review alternative behaviors Weekly 06/06/18 --    Intervention Details:  Duration of treatment until until discharged.       Goal Intervention Frequency Start Date End Date    Work with patient to develop constructive way to handle anger. Weekly 06/06/18 --    Intervention Details:  Duration of treatment until until discharged.             Problem: Depression  Start Date: 06/06/18    Problem Details:  The patient self-scales this problem as a 8 with 10 being the worst.       Goal Priority Start Date Expected End Date End Date    Patient will demonstrate the ability to initiate new constructive life skills outside of sessions on a consistent basis. -- 06/06/18 -- --    Goal Details:  Progress toward goal:  Not appropriate to rate progress toward goal since this is the initial treatment plan.       Goal Intervention Frequency Start Date End Date    Assist patient in setting attainable activities of daily living goals. PRN 06/06/18 --    Goal Intervention Frequency Start Date End Date    Provide education about depression Weekly 06/06/18 --    Intervention Details:  Duration of treatment until until discharged.       Goal Intervention Frequency Start Date End Date    Assist patient in developing healthy coping strategies. Weekly 06/06/18 --    Intervention Details:  Duration of treatment until  until discharged.                          I have discussed and reviewed this treatment plan with the patient.  It has been printed for signatures.

## 2018-06-06 NOTE — PROGRESS NOTES
".Date of Service: June 6, 2018  Time In: 4:45 PM  Time Out: 5:30 PM      PROGRESS NOTE  Data:  Nicholas Oviedo is a 32 y.o. male who presents for individual therapy session at [facility].  Patient states the past week has been rough.  He reports being under a great deal of financial and relational stress at home.  He reports \"my wife had a meltdown\" over the weekend.  He thought she was over the affair, but realized this weekend that she is harboring feelings of resentment.  Patient and his wife have had to borrow money from relatives to get caught up on their bills.  Patient reports this is embarrassing for him.     Patient adamantly and convincingly denies current suicidal or homicidal ideation or perceptual disturbance.    Clinical Maneuvering/Intervention:  Assisted patient in processing above session content; acknowledged and normalized patient’s thoughts, feelings, and concerns.  Consistent eye contact, active listening, unconditional positive regard, and warm except in 3 used to build trust with the patient.  Patient began to express feelings more freely as rapport and trust continue to increase. Collaborated with patient to complete first treatment plan to guide ongoing services.     Assisted patient in identifying risk factors which would indicate the need for higher level of care including thoughts to harm self or others and/or self-harming behavior and encouraged patient to contact this office, call 911, or present to the nearest emergency room should any of these events occur. Discussed crisis intervention services and means to access.    Assessment          Mental Status Exam  Hygiene:  good  Dress:  casual  Attitude:  Cooperative  Motor Activity:  Appropriate  Speech:  Normal  Mood:  sad  Affect:  depressed  Thought Processes:  Goal directed  Thought Content:  normal  Suicidal Thoughts:  denies  Homicidal Thoughts:  denies  Crisis Safety Plan: yes, to come to the emergency " room.  Hallucinations:  denies    Patient's Support Network Includes:  significant other, children and extended family    Functional Status: No impairment    Prognosis: Good with Ongoing Treatment       Plan     Recommend weekly therapy. Referred to GONZALO Huddleston for medication mgt. Patient will adhere to medication regimen as prescribed and report any side effects. Patient will contact this office, call 911 or present to the nearest emergency room should suicidal or homicidal ideations occur. Patient will be provided with Cognitive Behavioral Therapy and Solution Focused Therapy to improve functioning, maintain stability, and avoid decompensation and the need for higher level of care.            VISIT DIAGNOSIS:     ICD-10-CM ICD-9-CM   1. Severe episode of recurrent major depressive disorder, without psychotic features F33.2 296.33        Floresita Ramon, [facility]      Please note that portions of this note were completed with a voice recognition program. Efforts were made to edit dictation, but occasionally words are mistranscribed.

## 2018-06-25 ENCOUNTER — OFFICE VISIT (OUTPATIENT)
Dept: PSYCHIATRY | Facility: CLINIC | Age: 33
End: 2018-06-25

## 2018-06-25 DIAGNOSIS — F33.2 SEVERE EPISODE OF RECURRENT MAJOR DEPRESSIVE DISORDER, WITHOUT PSYCHOTIC FEATURES (HCC): Primary | ICD-10-CM

## 2018-06-25 DIAGNOSIS — Z63.0 MARITAL CONFLICT: ICD-10-CM

## 2018-06-25 PROCEDURE — 90834 PSYTX W PT 45 MINUTES: CPT | Performed by: COUNSELOR

## 2018-06-25 SDOH — SOCIAL STABILITY - SOCIAL INSECURITY: PROBLEMS IN RELATIONSHIP WITH SPOUSE OR PARTNER: Z63.0

## 2018-06-25 NOTE — PROGRESS NOTES
".Date of Service: June 25, 2018   Time In: 4:00pm  Time Out: 4:45pm      PROGRESS NOTE  Data:  Nicholas Oviedo is a 32 y.o. male who presents for individual therapy session at [facility]. Patient reports ongoing stress in his marriage and with finances. He has been offered a new job that will alleviate financial stress, yet may cause more stress on relationship due to the second shift hours. He worries about missing out on events and general day-to-day interactions with kids. Patient reports feeling pressures at home and work to make more money and to also be a \"better \".  Patient reports going to PCP since last session to discuss changing sleep medication. He has an upcoming appointment with APRN at this clinic to further assess for medication management.     Patient adamantly and convincingly denies current suicidal or homicidal ideation or perceptual disturbance.      Clinical Maneuvering/Intervention:  Therapist focused on providing psychoeducation and review of differences between positive stress and negative stress. Special attention was was focused on his identified stressors within a short period of time and how severe their effects (in his case, a recent suicide attempt).  Assisted patient in identifying two ways to define stress: internal factors or external factors. Additionally, we discussed how he responds to stressful events and how his reaction can either increase or decrease the overall experience of stress. Failure to effectively cope with a stressful situation contributes to a feeling of things being more difficult, adding to the level of stress already felt from external sources (wife, kids, job). Provided him with tools of self-care, use of resources, and self-talk to form the foundation of effective stress management.     Assisted patient in identifying risk factors which would indicate the need for higher level of care including thoughts to harm self or others and/or " self-harming behavior and encouraged patient to contact this office, call 911, or present to the nearest emergency room should any of these events occur. Discussed crisis intervention services and means to access.    Assessment          Mental Status Exam  Hygiene:  good  Dress:  casual  Attitude:  Cooperative  Motor Activity:  Appropriate  Speech:  Normal  Mood:  sad  Affect:  depressed and anxious  Thought Processes:  Goal directed  Thought Content:  normal  Suicidal Thoughts:  denies  Homicidal Thoughts:  denies  Crisis Safety Plan: yes, to come to the emergency room.  Hallucinations:  denies    Patient's Support Network Includes:  wife, children and extended family    Functional Status: No impairment    Prognosis: Good with Ongoing Treatment       Plan     Recommend weekly therapy. Patient will adhere to medication regimen as prescribed and report any side effects. Patient will contact this office, call 911 or present to the nearest emergency room should suicidal or homicidal ideations occur. Patient will be provided with Cognitive Behavioral Therapy and Solution Focused Therapy to improve functioning, maintain stability, and avoid decompensation and the need for higher level of care.            VISIT DIAGNOSIS:     ICD-10-CM ICD-9-CM   1. Severe episode of recurrent major depressive disorder, without psychotic features F33.2 296.33   2. Marital conflict Z63.0 V61.10        Floresita Ramon [facility]      Please note that portions of this note were completed with a voice recognition program. Efforts were made to edit dictation, but occasionally words are mistranscribed.

## 2018-07-02 ENCOUNTER — OFFICE VISIT (OUTPATIENT)
Dept: PSYCHIATRY | Facility: CLINIC | Age: 33
End: 2018-07-02

## 2018-07-02 VITALS — BODY MASS INDEX: 24.38 KG/M2 | WEIGHT: 180 LBS | HEIGHT: 72 IN

## 2018-07-02 DIAGNOSIS — F51.05 INSOMNIA DUE TO MENTAL CONDITION: ICD-10-CM

## 2018-07-02 DIAGNOSIS — F33.2 SEVERE EPISODE OF RECURRENT MAJOR DEPRESSIVE DISORDER, WITHOUT PSYCHOTIC FEATURES (HCC): Primary | ICD-10-CM

## 2018-07-02 DIAGNOSIS — Z63.0 MARITAL CONFLICT: ICD-10-CM

## 2018-07-02 PROCEDURE — 99214 OFFICE O/P EST MOD 30 MIN: CPT | Performed by: NURSE PRACTITIONER

## 2018-07-02 SDOH — SOCIAL STABILITY - SOCIAL INSECURITY: PROBLEMS IN RELATIONSHIP WITH SPOUSE OR PARTNER: Z63.0

## 2018-07-02 NOTE — PROGRESS NOTES
"    Subjective   Nicholas Oviedo is a 32 y.o. male who here today for medication evaluation post hospitalization. He had been at the [facility] in May 2018 for attempted suicide with gun and diagnosed with  MDD severe without psychotic features , he has been in therapy and on medication management since. He lives in [place] with his wife and two young sons. 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 His wife works full time for [employer].     Chief Complaint: MDD recurrent without psychosis, insomnia, marital conflict    History of Present Illness Patient presents by himself for medication management. He is in [facility] with [facility] since his discharge from the [facility] in May 2018. Patient reports he is taking Prozac 20 mg PO one QD and rates depression a 2. He is sleeping much better now with quetiapine 25 mg taking it nightly but feels \"dragged out all morning\". He takes it at 9 p and goes to sleep about 10:30p. Discussed cutting it in half and evaluate sleep and if any \"hang over \" effect. Patient adamantly denies AVH, denies SI/HI or thoughts of self harming. Denies illicit drug use or alcohol. He and his wife are working on solidifying their marriage. They are working [employer] and and states he has such a different up bringing than his wife. He is in therapy now for this. Patient denies panic, denies OCD, PTSD, or priscilla ever. He states he feels much better in control of his emotions and that he and his wife are committed to stay  and work on difficulties. He sees his PCP for medications and would like to cont that because she is 5 minutes from the house vs 45 minutes. He feels comfortable with working with therapist and addressing issues. Stressors had been financial and he reports he is changing " "jobs to make more money and will like the work better. He also had a \"one night stand\" and told his wife and that was in May when she took the kids and left. But they had reconciled quickly working on marriage and doing well. Patient states he has years of sleep disturbance with initiating and staying but Remeron was   Way too sedating\" and had to stop that was switched to Seroquel. He denies sleep apnea.   (Scales based on 0 - 10 with 10 being the worst)        The following portions of the patient's history were reviewed and updated as appropriate: allergies, current medications, past family history, past medical history, past social history, past surgical history and problem list.    Review of Systems denies fever, cough, s/s’s of infection, denies GI/ problems, denies new medical issues     Objective   Physical Exam  Height 182.9 cm (72\"), weight 81.6 kg (180 lb).    No Known Allergies    Current Medications:   Current Outpatient Prescriptions   Medication Sig Dispense Refill   • FLUoxetine (PROzac) 20 MG capsule Take 20 mg by mouth Daily.       No current facility-administered medications for this visit.      Newer results are available. Click to view them now.     Ref Range & Units 1mo ago 2yr ago    Glucose 74 - 98 mg/dL 107   78R     BUN 7 - 20 mg/dL 11  9R     Creatinine 0.60 - 1.30 mg/dL 1.00  0.9R     Sodium 137 - 145 mmol/L 139  138R     Potassium 3.5 - 5.1 mmol/L 3.4   3.7R     Chloride 98 - 107 mmol/L 101  101R     CO2 26.0 - 30.0 mmol/L 28.0      Calcium 8.4 - 10.2 mg/dL 8.6  9.2R     Total Protein 6.3 - 8.2 g/dL 7.2  7.4R     Albumin 3.50 - 5.00 g/dL 3.90  4.3R     ALT (SGPT) 13 - 69 U/L 22  15R     AST (SGOT) 15 - 46 U/L 20  18R     Alkaline Phosphatase 38 - 126 U/L 74  68R     Total Bilirubin 0.2 - 1.3 mg/dL 0.3  0.6R     eGFR Non African Amer >60 mL/min/1.73 87      Globulin gm/dL 3.3      A/G Ratio 1.0 - 2.0 g/dL 1.2      BUN/Creatinine Ratio 6.3 - 21.9 11.0      Anion Gap 10.0 - 20.0 mmol/L " 13.4  7R    Resulting Agency   [facility]        CBC Auto Differential   Order: [id number] - Part of Panel Order [id number]   Status:  Final result   Visible to patient:  No (Not Released)     Ref Range & Units 1mo ago 2yr ago 4yr ago    WBC 4.80 - 10.80 10*3/mm3 10.19  7.65R  10.3R     RBC 4.70 - 6.10 10*6/mm3 4.06   4.69R  4.59R      Hemoglobin 14.0 - 18.0 g/dL 11.0   14.5R  14.3     Hematocrit 42.0 - 52.0 % 33.1   42.4R  42R     MCV 80.0 - 94.0 fL 81.5  90.4R  92.3     MCH 27.0 - 31.0 pg 27.1  30.9  31.1R      MCHC 30.0 - 37.0 g/dL 33.2  34.2R  33.8     RDW 11.5 - 14.5 % 12.8   11.4      RDW-SD 37.0 - 54.0 fl 38.4       MPV 6.0 - 12.0 fL 9.3       Platelets 130 - 400 10*3/mm3 335  240R  219R     Neutrophil % 37.0 - 80.0 % 75.8  53.8R      Lymphocyte % 10.0 - 50.0 % 16.0  36.9R      Monocyte % 0.0 - 12.0 % 7.3  8.4      Eosinophil % 0.0 - 7.0 % 0.3  0.5R      Basophil % 0.0 - 2.5 % 0.2  0.3R      Immature Grans % 0.0 - 0.6 % 0.4  0.1      Neutrophils, Absolute 2.00 - 6.90 10*3/mm3 7.73   4.12R      Lymphocytes, Absolute 0.60 - 3.40 10*3/mm3 1.63  2.82R      Monocytes, Absolute 0.00 - 0.90 10*3/mm3 0.74  0.64R      Eosinophils, Absolute 0.00 - 0.70 10*3/mm3 0.03  0.04R       Basophils, Absolute 0.00 - 0.20 10*3/mm3 0.02  0.02R      Immature Grans, Absolute 0.00 - 0.06 10*3/mm3 0.04       nRBC 0.0 - 0.0 /100 WBC 0.0      Resulting Agency   [facility]      Specimen Collected: 05/20/18 20:03 Last Resulted: 05/20/18 20:18              R=Reference range differs from displayed range                 Ref Range & Units 1mo ago   THC, Screen, Urine Negative Negative    Phencyclidine (PCP), Urine Negative Negative    Cocaine Screen, Urine Negative Negative    Methamphetamine, Urine Negative Negative    Opiate Screen Negative Negative    Amphetamine Screen, Urine Negative Negative    Benzodiazepine Screen, Urine Negative Negative    Tricyclic Antidepressants Screen Negative Negative    Methadone  Screen, Urine Negative Negative    Barbiturates Screen, Urine Negative Negative    Oxycodone Screen, Urine Negative Negative    Propoxyphene Screen Negative Negative    Buprenorphine, Screen, Urine Negative Negative    Resulting Agency  [facility]   Narrative      Ethanol   Order: [id number]  5/20/2018  Status:  Final result   Visible to patient:  No (Not Released)    Ref Range & Units 1mo ago   Ethanol <=10 mg/dL <10    Ethanol % % <0.010    Resulting Agency  [facility]           Appearance: appropriate  Hygiene:   good  Cooperation:  Cooperative  Eye Contact:  Good  Psychomotor Behavior:  Appropriate  Mood:  within normal limits  Affect:  Appropriate  Hopelessness: Denies  Speech:  Normal  Thought Process:  Linear  Thought Content:  Normal  Concentration: Normal   Suicidal:  None  Homicidal:  None  Hallucinations:  None  Delusion:  None  Memory:  Intact  Orientation:  Person, Place, Time and Situation  Reliability:  fair  Insight:  Fair  Judgement:  Fair  Impulse Control:  Fair  Estimated Intelligence: average range   Physical/Medical Issues:  No     MARTINA REVIEWED NO RED FLAGS   UDS: reviewed from 5/20/2018 admission to [facility] admission     Assessment/Plan   Diagnoses and all orders for this visit:    Severe episode of recurrent major depressive disorder, without psychotic features    Marital conflict    Insomnia due to mental condition    25 minutes face to face reviewing admission to [facility], reviewing lab work , reviewing current issues and concerns, med management  IMPRESSION: improving mood, working on marriage, in therapy, on Prozac and Seroquel for sleep     PLAN: cont prozac   Pt was placed on seroquel by his PCP Galilea Pulido and 25 mg and too sedating try 1/2 tablet   Pt has refills from his PCP and she has been managing medications, much of pt's worries, concerns, issues was from marital conflict and past trauma, he would like to cont med management with his PCP and cont therapy with  Floresita Ramon [facility]  Pt working on love languages within marriage and also working on financial difficulties transitioning to new work.     We discussed risks, benefits, and side effects of the above medications and the patient was agreeable with the plan. Patient was educated on the importance of compliance with treatment and follow-up appointments.     Sleep hygiene reviewed, encouraged more whole foods, less processed foods, fruits   Coping skills reviewed and encouraged positive framing of thoughts    Assisted patient in processing above session content; acknowledged and normalized patient’s thoughts, feelings, and concerns.  Applied  positive coping skills and behavior management in session.  Allowed patient to freely discuss issues without interruption or judgment. Provided safe, confidential environment to facilitate the development of positive therapeutic relationship and encourage open, honest communication. Assisted patient in identifying risk factors which would indicate the need for higher level of care including thoughts to harm self or others and/or self-harming behavior and encouraged patient to contact this office, call 911, or present to the nearest emergency room should any of these events occur. Discussed crisis intervention services and means to access.  Patient adamantly and convincingly denies current suicidal or homicidal ideation or perceptual disturbance.    Instructed to call for questions or concerns and return early if necessary. Crisis plan reviewed including going to the Emergency department.    Return if symptoms worsen or fail to improve, for he is going to get  meds through his PCP.

## 2018-07-18 ENCOUNTER — OFFICE VISIT (OUTPATIENT)
Dept: PSYCHIATRY | Facility: CLINIC | Age: 33
End: 2018-07-18

## 2018-07-18 DIAGNOSIS — Z63.0 MARITAL CONFLICT: ICD-10-CM

## 2018-07-18 DIAGNOSIS — F51.05 INSOMNIA DUE TO MENTAL CONDITION: ICD-10-CM

## 2018-07-18 DIAGNOSIS — F33.2 SEVERE EPISODE OF RECURRENT MAJOR DEPRESSIVE DISORDER, WITHOUT PSYCHOTIC FEATURES (HCC): Primary | ICD-10-CM

## 2018-07-18 PROCEDURE — 90834 PSYTX W PT 45 MINUTES: CPT | Performed by: COUNSELOR

## 2018-07-18 SDOH — SOCIAL STABILITY - SOCIAL INSECURITY: PROBLEMS IN RELATIONSHIP WITH SPOUSE OR PARTNER: Z63.0
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
## 2019-03-10 ENCOUNTER — OFFICE VISIT (OUTPATIENT)
Dept: PRIMARY CARE CLINIC | Age: 34
End: 2019-03-10
Payer: COMMERCIAL

## 2019-03-10 VITALS
HEART RATE: 101 BPM | BODY MASS INDEX: 23.87 KG/M2 | OXYGEN SATURATION: 95 % | SYSTOLIC BLOOD PRESSURE: 127 MMHG | TEMPERATURE: 97.8 F | DIASTOLIC BLOOD PRESSURE: 77 MMHG | WEIGHT: 176 LBS

## 2019-03-10 DIAGNOSIS — J06.9 URI, ACUTE: Primary | ICD-10-CM

## 2019-03-10 PROCEDURE — 96372 THER/PROPH/DIAG INJ SC/IM: CPT | Performed by: NURSE PRACTITIONER

## 2019-03-10 PROCEDURE — 99213 OFFICE O/P EST LOW 20 MIN: CPT | Performed by: NURSE PRACTITIONER

## 2019-03-10 RX ORDER — METHYLPREDNISOLONE SODIUM SUCCINATE 40 MG/ML
40 INJECTION, POWDER, LYOPHILIZED, FOR SOLUTION INTRAMUSCULAR; INTRAVENOUS ONCE
Qty: 40 MG | Refills: 0 | Status: SHIPPED | OUTPATIENT
Start: 2019-03-10 | End: 2019-03-10 | Stop reason: CLARIF

## 2019-03-10 RX ORDER — METHYLPREDNISOLONE 4 MG/1
TABLET ORAL
Qty: 1 KIT | Refills: 0 | Status: SHIPPED | OUTPATIENT
Start: 2019-03-10 | End: 2019-03-16

## 2019-03-10 RX ORDER — GUAIFENESIN 600 MG/1
1200 TABLET, EXTENDED RELEASE ORAL 2 TIMES DAILY PRN
COMMUNITY
Start: 2019-03-10 | End: 2022-02-17

## 2019-03-10 RX ORDER — METHYLPREDNISOLONE SODIUM SUCCINATE 40 MG/ML
40 INJECTION, POWDER, LYOPHILIZED, FOR SOLUTION INTRAMUSCULAR; INTRAVENOUS ONCE
Status: COMPLETED | OUTPATIENT
Start: 2019-03-10 | End: 2019-03-10

## 2019-03-10 RX ADMIN — METHYLPREDNISOLONE SODIUM SUCCINATE 40 MG: 40 INJECTION, POWDER, LYOPHILIZED, FOR SOLUTION INTRAMUSCULAR; INTRAVENOUS at 12:40

## 2019-03-10 ASSESSMENT — ENCOUNTER SYMPTOMS
COUGH: 1
SHORTNESS OF BREATH: 1
CHEST TIGHTNESS: 1

## 2019-09-12 ENCOUNTER — HOSPITAL ENCOUNTER (OUTPATIENT)
Facility: HOSPITAL | Age: 34
Discharge: HOME | End: 2019-09-12

## 2019-09-12 PROCEDURE — 99001 SPECIMEN HANDLING PT-LAB: CPT

## 2020-06-25 ENCOUNTER — APPOINTMENT (OUTPATIENT)
Dept: GENERAL RADIOLOGY | Facility: HOSPITAL | Age: 35
End: 2020-06-25

## 2020-06-25 ENCOUNTER — HOSPITAL ENCOUNTER (EMERGENCY)
Facility: HOSPITAL | Age: 35
Discharge: HOME | End: 2020-06-25
Attending: STUDENT IN AN ORGANIZED HEALTH CARE EDUCATION/TRAINING PROGRAM | Admitting: STUDENT IN AN ORGANIZED HEALTH CARE EDUCATION/TRAINING PROGRAM

## 2020-06-25 VITALS
TEMPERATURE: 98.1 F | WEIGHT: 176 LBS | DIASTOLIC BLOOD PRESSURE: 88 MMHG | SYSTOLIC BLOOD PRESSURE: 141 MMHG | HEART RATE: 79 BPM | OXYGEN SATURATION: 99 % | HEIGHT: 72 IN | RESPIRATION RATE: 14 BRPM | BODY MASS INDEX: 23.84 KG/M2

## 2020-06-25 DIAGNOSIS — S80.12XA CONTUSION OF LEFT LOWER LEG, INITIAL ENCOUNTER: Primary | ICD-10-CM

## 2020-06-25 PROCEDURE — 99283 EMERGENCY DEPT VISIT LOW MDM: CPT

## 2020-06-25 PROCEDURE — 73590 X-RAY EXAM OF LOWER LEG: CPT

## 2020-06-25 RX ORDER — SENNOSIDES 8.6 MG
650 CAPSULE ORAL EVERY 8 HOURS PRN
Qty: 12 TABLET | Refills: 0 | Status: SHIPPED | OUTPATIENT
Start: 2020-06-25

## 2020-06-25 RX ORDER — IBUPROFEN 800 MG/1
800 TABLET ORAL ONCE
Status: COMPLETED | OUTPATIENT
Start: 2020-06-25 | End: 2020-06-25

## 2020-06-25 RX ORDER — QUETIAPINE FUMARATE 25 MG/1
25 TABLET, FILM COATED ORAL NIGHTLY
COMMUNITY

## 2020-06-25 RX ORDER — IBUPROFEN 600 MG/1
600 TABLET ORAL EVERY 8 HOURS PRN
Qty: 12 TABLET | Refills: 0 | Status: SHIPPED | OUTPATIENT
Start: 2020-06-25

## 2020-06-25 RX ADMIN — IBUPROFEN 800 MG: 800 TABLET ORAL at 21:34
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
## 2020-07-09 ENCOUNTER — HOSPITAL ENCOUNTER (OUTPATIENT)
Facility: HOSPITAL | Age: 35
Discharge: HOME | End: 2020-07-09

## 2020-07-09 PROCEDURE — 99001 SPECIMEN HANDLING PT-LAB: CPT

## 2020-11-30 ENCOUNTER — HOSPITAL ENCOUNTER (OUTPATIENT)
Dept: GENERAL RADIOLOGY | Facility: HOSPITAL | Age: 35
Discharge: HOME | End: 2020-11-30
Payer: COMMERCIAL

## 2020-11-30 ENCOUNTER — HOSPITAL ENCOUNTER (OUTPATIENT)
Facility: HOSPITAL | Age: 35
Discharge: HOME | End: 2020-11-30
Payer: COMMERCIAL

## 2020-11-30 PROCEDURE — 73030 X-RAY EXAM OF SHOULDER: CPT

## 2020-12-09 ENCOUNTER — HOSPITAL ENCOUNTER (OUTPATIENT)
Facility: HOSPITAL | Age: 35
Discharge: HOME | End: 2020-12-09
Payer: MEDICAID

## 2020-12-09 PROCEDURE — 99001 SPECIMEN HANDLING PT-LAB: CPT

## 2021-01-21 ENCOUNTER — HOSPITAL ENCOUNTER (OUTPATIENT)
Facility: HOSPITAL | Age: 36
Discharge: HOME | End: 2021-01-21
Payer: MEDICAID

## 2021-02-23 ENCOUNTER — TRANSCRIBE ORDERS (OUTPATIENT)
Dept: ADMINISTRATIVE | Facility: HOSPITAL | Age: 36
End: 2021-02-23

## 2021-02-23 DIAGNOSIS — M25.512 CHRONIC LEFT SHOULDER PAIN: Primary | ICD-10-CM

## 2021-02-23 DIAGNOSIS — G89.29 CHRONIC LEFT SHOULDER PAIN: Primary | ICD-10-CM

## 2021-03-08 ENCOUNTER — HOSPITAL ENCOUNTER (OUTPATIENT)
Dept: MRI IMAGING | Facility: HOSPITAL | Age: 36
Discharge: HOME | End: 2021-03-08
Admitting: NURSE PRACTITIONER

## 2021-03-08 DIAGNOSIS — M25.512 CHRONIC LEFT SHOULDER PAIN: ICD-10-CM

## 2021-03-08 DIAGNOSIS — G89.29 CHRONIC LEFT SHOULDER PAIN: ICD-10-CM

## 2021-03-08 PROCEDURE — 73221 MRI JOINT UPR EXTREM W/O DYE: CPT

## 2021-05-04 ENCOUNTER — HOSPITAL ENCOUNTER (OUTPATIENT)
Facility: HOSPITAL | Age: 36
Discharge: HOME | End: 2021-05-04
Payer: MEDICAID

## 2021-05-04 PROCEDURE — 99001 SPECIMEN HANDLING PT-LAB: CPT

## 2021-09-12 ENCOUNTER — HOSPITAL ENCOUNTER (EMERGENCY)
Facility: HOSPITAL | Age: 36
Discharge: HOME | End: 2021-09-12
Attending: FAMILY MEDICINE | Admitting: FAMILY MEDICINE

## 2021-09-12 ENCOUNTER — APPOINTMENT (OUTPATIENT)
Dept: CT IMAGING | Facility: HOSPITAL | Age: 36
End: 2021-09-12

## 2021-09-12 VITALS
OXYGEN SATURATION: 98 % | RESPIRATION RATE: 17 BRPM | WEIGHT: 176.2 LBS | DIASTOLIC BLOOD PRESSURE: 90 MMHG | TEMPERATURE: 98.1 F | HEIGHT: 72 IN | BODY MASS INDEX: 23.86 KG/M2 | SYSTOLIC BLOOD PRESSURE: 140 MMHG | HEART RATE: 96 BPM

## 2021-09-12 DIAGNOSIS — S20.219A CONTUSION OF CHEST WALL, UNSPECIFIED LATERALITY, INITIAL ENCOUNTER: Primary | ICD-10-CM

## 2021-09-12 PROCEDURE — 99282 EMERGENCY DEPT VISIT SF MDM: CPT

## 2021-09-12 PROCEDURE — 71250 CT THORAX DX C-: CPT
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
## 2022-02-17 ENCOUNTER — APPOINTMENT (OUTPATIENT)
Dept: GENERAL RADIOLOGY | Facility: HOSPITAL | Age: 37
End: 2022-02-17
Payer: COMMERCIAL

## 2022-02-17 ENCOUNTER — HOSPITAL ENCOUNTER (EMERGENCY)
Facility: HOSPITAL | Age: 37
Discharge: HOME | End: 2022-02-17
Attending: EMERGENCY MEDICINE
Payer: COMMERCIAL

## 2022-02-17 VITALS
OXYGEN SATURATION: 99 % | WEIGHT: 177 LBS | SYSTOLIC BLOOD PRESSURE: 145 MMHG | TEMPERATURE: 98.2 F | HEART RATE: 85 BPM | RESPIRATION RATE: 16 BRPM | DIASTOLIC BLOOD PRESSURE: 84 MMHG | BODY MASS INDEX: 24.01 KG/M2

## 2022-02-17 DIAGNOSIS — J06.9 UPPER RESPIRATORY TRACT INFECTION DUE TO COVID-19 VIRUS: Primary | ICD-10-CM

## 2022-02-17 DIAGNOSIS — U07.1 UPPER RESPIRATORY TRACT INFECTION DUE TO COVID-19 VIRUS: Primary | ICD-10-CM

## 2022-02-17 LAB
A/G RATIO: 1.4 (ref 0.8–2)
ALBUMIN SERPL-MCNC: 3.8 G/DL (ref 3.4–4.8)
ALP BLD-CCNC: 80 U/L (ref 25–100)
ALT SERPL-CCNC: 23 U/L (ref 4–36)
ANION GAP SERPL CALCULATED.3IONS-SCNC: 11 MMOL/L (ref 3–16)
APTT: 24.7 SEC (ref 22.5–34.9)
AST SERPL-CCNC: 17 U/L (ref 8–33)
BASOPHILS ABSOLUTE: 0 K/UL (ref 0–0.1)
BASOPHILS RELATIVE PERCENT: 0.6 %
BILIRUB SERPL-MCNC: 0.3 MG/DL (ref 0.3–1.2)
BUN BLDV-MCNC: 11 MG/DL (ref 6–20)
CALCIUM SERPL-MCNC: 8.7 MG/DL (ref 8.5–10.5)
CHLORIDE BLD-SCNC: 100 MMOL/L (ref 98–107)
CO2: 26 MMOL/L (ref 20–30)
CREAT SERPL-MCNC: 1 MG/DL (ref 0.4–1.2)
D DIMER: <0.27 UG/ML FEU (ref 0–0.6)
EOSINOPHILS ABSOLUTE: 0.1 K/UL (ref 0–0.4)
EOSINOPHILS RELATIVE PERCENT: 2.1 %
GFR AFRICAN AMERICAN: >59
GFR NON-AFRICAN AMERICAN: >59
GLOBULIN: 2.8 G/DL
GLUCOSE BLD-MCNC: 99 MG/DL (ref 74–106)
HCT VFR BLD CALC: 41.9 % (ref 40–54)
HEMOGLOBIN: 14.3 G/DL (ref 13–18)
IMMATURE GRANULOCYTES #: 0.3 K/UL
IMMATURE GRANULOCYTES %: 4 % (ref 0–5)
INR BLD: 0.99 (ref 0.88–1.11)
LACTIC ACID, SEPSIS: 1.8 MMOL/L (ref 0.4–1.9)
LYMPHOCYTES ABSOLUTE: 2.9 K/UL (ref 1.5–4)
LYMPHOCYTES RELATIVE PERCENT: 42.6 %
MCH RBC QN AUTO: 31 PG (ref 27–32)
MCHC RBC AUTO-ENTMCNC: 34.1 G/DL (ref 31–35)
MCV RBC AUTO: 90.7 FL (ref 80–100)
MONOCYTES ABSOLUTE: 0.9 K/UL (ref 0.2–0.8)
MONOCYTES RELATIVE PERCENT: 13.4 %
NEUTROPHILS ABSOLUTE: 2.5 K/UL (ref 2–7.5)
NEUTROPHILS RELATIVE PERCENT: 37.3 %
PDW BLD-RTO: 12.1 % (ref 11–16)
PLATELET # BLD: 290 K/UL (ref 150–400)
PMV BLD AUTO: 8.9 FL (ref 6–10)
POTASSIUM REFLEX MAGNESIUM: 3.7 MMOL/L (ref 3.4–5.1)
PRO-BNP: 17 PG/ML (ref 0–900)
PROTHROMBIN TIME: 12.6 SEC (ref 11.6–13.8)
RBC # BLD: 4.62 M/UL (ref 4.5–6)
SODIUM BLD-SCNC: 137 MMOL/L (ref 136–145)
TOTAL PROTEIN: 6.6 G/DL (ref 6.4–8.3)
TROPONIN: <0.3 NG/ML
WBC # BLD: 6.8 K/UL (ref 4–11)

## 2022-02-17 PROCEDURE — 83605 ASSAY OF LACTIC ACID: CPT

## 2022-02-17 PROCEDURE — 36415 COLL VENOUS BLD VENIPUNCTURE: CPT

## 2022-02-17 PROCEDURE — 85025 COMPLETE CBC W/AUTO DIFF WBC: CPT

## 2022-02-17 PROCEDURE — 2580000003 HC RX 258: Performed by: EMERGENCY MEDICINE

## 2022-02-17 PROCEDURE — 80053 COMPREHEN METABOLIC PANEL: CPT

## 2022-02-17 PROCEDURE — 85610 PROTHROMBIN TIME: CPT

## 2022-02-17 PROCEDURE — 84484 ASSAY OF TROPONIN QUANT: CPT

## 2022-02-17 PROCEDURE — 85730 THROMBOPLASTIN TIME PARTIAL: CPT

## 2022-02-17 PROCEDURE — 99282 EMERGENCY DEPT VISIT SF MDM: CPT

## 2022-02-17 PROCEDURE — 83880 ASSAY OF NATRIURETIC PEPTIDE: CPT

## 2022-02-17 PROCEDURE — 71045 X-RAY EXAM CHEST 1 VIEW: CPT

## 2022-02-17 PROCEDURE — 85379 FIBRIN DEGRADATION QUANT: CPT

## 2022-02-17 RX ORDER — SODIUM CHLORIDE, SODIUM LACTATE, POTASSIUM CHLORIDE, AND CALCIUM CHLORIDE .6; .31; .03; .02 G/100ML; G/100ML; G/100ML; G/100ML
1000 INJECTION, SOLUTION INTRAVENOUS ONCE
Status: COMPLETED | OUTPATIENT
Start: 2022-02-17 | End: 2022-02-17

## 2022-02-17 RX ADMIN — SODIUM CHLORIDE, POTASSIUM CHLORIDE, SODIUM LACTATE AND CALCIUM CHLORIDE 1000 ML: 600; 310; 30; 20 INJECTION, SOLUTION INTRAVENOUS at 18:35

## 2022-02-17 NOTE — ED TRIAGE NOTES
Pt arrives to ED POV with complaints of SOA and being \"jittery\". Pt tested positive for COVID on 2/8/2022. O2 sat 99% on room air.

## 2022-02-17 NOTE — ED PROVIDER NOTES
[facility] ENCOUNTER      Pt Name: Lizzie Jones  MRN: [id number]  YOB: 1985  Date of evaluation: 2/17/2022  Provider: Kae Mishra MD    [address] [place] [address]       Chief Complaint   Patient presents with    Shortness of Breath    Positive For Covid-19         HISTORY OF PRESENT ILLNESS  (Location/Symptom, Timing/Onset, Context/Setting, Quality, Duration, Modifying Factors, Severity.)   Lizzie Jones is a 39 y.o. male who presents to the emergency department patient tested positive for COVID 9 days ago he was given a course of steroids but he is feeling worse instead of better he has when he gets up and gets around he feels dizzy he has got a chronic headache denies any fever that has resolved but he still having head congestion with green nasal discharge and a cough which is occasionally productive of green discharge as well he denies any sore throat he gets very short of breath on exertion just feels like he has no energy and could sleep all day denies any chest pain denies nausea vomiting or diarrhea. He has no underlying medical problems and does not smoke. Nursing notes were reviewed. REVIEW OFSYSTEMS    (2-9 systems for level 4, 10 or more for level 5)   ROS:  General:  No fevers, no chills, no weakness  Cardiovascular:  No chest pain, no palpitations  Respiratory:  + shortness of breath, + cough, no wheezing  Gastrointestinal:  No pain, no nausea, no vomiting, no diarrhea  Musculoskeletal:  No muscle pain, no joint pain  Skin:  No rash, no easy bruising  Neurologic:  No speech problems, no headache, no extremity weakness  Psychiatric:  No anxiety  Genitourinary:  No dysuria, no hematuria    Except as noted above the remainder of the review of systems was reviewed and negative.        PAST MEDICAL HISTORY     Past Medical History:   Diagnosis Date    Anxiety     Depression     Irritable bowel syndrome SURGICAL HISTORY       Past Surgical History:   Procedure Laterality Date    TONSILLECTOMY           CURRENT MEDICATIONS       Previous Medications    FLUOXETINE (PROZAC) 20 MG CAPSULE    Take 20 mg by mouth daily    QUETIAPINE FUMARATE (SEROQUEL PO)    Take by mouth       ALLERGIES     Patient has no known allergies. FAMILY HISTORY       Family History   Problem Relation Age of Onset    Coronary Art Dis Father     Hypertension Father           SOCIAL HISTORY       Social History     Socioeconomic History    Marital status:      Spouse name: None    Number of children: None    Years of education: None    Highest education level: None   Occupational History    None   Tobacco Use    Smoking status: Never Smoker    Smokeless tobacco: Current User     Types: Chew   Substance and Sexual Activity    Alcohol use: No    Drug use: No    Sexual activity: None   Other Topics Concern    None   Social History Narrative    None     Social Determinants of Health     Financial Resource Strain:     Difficulty of Paying Living Expenses: Not on file   Food Insecurity:     Worried About Running Out of Food in the Last Year: Not on file    Laurent of Food in the Last Year: Not on file   Transportation Needs:     Lack of Transportation (Medical): Not on file    Lack of Transportation (Non-Medical):  Not on file   Physical Activity:     Days of Exercise per Week: Not on file    Minutes of Exercise per Session: Not on file   Stress:     Feeling of Stress : Not on file   Social Connections:     Frequency of Communication with Friends and Family: Not on file    Frequency of Social Gatherings with Friends and Family: Not on file    Attends Worship Services: Not on file    Active Member of Clubs or Organizations: Not on file    Attends Club or Organization Meetings: Not on file    Marital Status: Not on file   Intimate Partner Violence:     Fear of Current or Ex-Partner: Not on file   Karine Cheney Emotionally Abused: Not on file    Physically Abused: Not on file    Sexually Abused: Not on file   Housing Stability:     Unable to Pay for Housing in the Last Year: Not on file    Number of [place] in the Last Year: Not on file    Unstable Housing in the Last Year: Not on file         PHYSICAL EXAM    (up to 7 for level 4, 8 or more for level 5)     ED Triage Vitals [02/17/22 1636]   BP Temp Temp src Pulse Resp SpO2 Height Weight   (!) 145/84 -- -- 85 16 99 % -- 177 lb (80.3 kg)       Physical Exam  General :Patient is awake, alert, oriented, in no acute distress, nontoxic appearing  HEENT: Pupils are equally round and reactive to light, EOMI, conjunctivae clear. Neck: Neck is supple, full range of motion, trachea midline  Cardiac: Heart regular rate, rhythm, no murmurs, rubs, or gallops  Lungs: Lungs are clear to auscultation, there is no wheezing, rhonchi, or rales. There is no use of accessory muscles. Chest wall: There is no tenderness to palpation over the chest wall or over ribs  Abdomen: Abdomen is soft, nontender, nondistended. There is no firm or pulsatile masses, no rebound rigidity or guarding. Musculoskeletal: 5 out of 5 strength in all 4 extremities. No focal muscle deficits are appreciated  Neuro: Motor intact, sensory intact, level of consciousness is normal,Dermatology: Skin is warm and dry  Psych: Mentation is grossly normal, cognition is grossly normal. Affect is appropriate.       DIAGNOSTIC RESULTS     EKG: All EKG's are interpreted by the Emergency Department Physician who either signs or Co-signs this chart in the [address] a cardiologist.        RADIOLOGY:   Non-plain film images such as CT, Ultrasound and MRI are read by the radiologist. Plain radiographic images are visualized and preliminarily interpreted by the emergency physician with the below findings:      ? Radiologist's Report Reviewed:  XR CHEST PORTABLE    (Results Pending)         ED BEDSIDE ULTRASOUND: Performed by ED Physician - none    LABS:    I have reviewed and interpreted all of the currently available lab results from this visit (ifapplicable):  Results for orders placed or performed during the hospital encounter of 02/17/22   CBC with Auto Differential   Result Value Ref Range    WBC 6.8 4.0 - 11.0 K/uL    RBC 4.62 4.50 - 6.00 M/uL    Hemoglobin 14.3 13.0 - 18.0 g/dL    Hematocrit 41.9 40.0 - 54.0 %    MCV 90.7 80.0 - 100.0 fL    MCH 31.0 27.0 - 32.0 pg    MCHC 34.1 31.0 - 35.0 g/dL    RDW 12.1 11.0 - 16.0 %    Platelets 765 622 - 185 K/uL    MPV 8.9 6.0 - 10.0 fL    Neutrophils % 37.3 %    Immature Granulocytes % 4.0 0.0 - 5.0 %    Lymphocytes % 42.6 %    Monocytes % 13.4 %    Eosinophils % 2.1 %    Basophils % 0.6 %    Neutrophils Absolute 2.5 2.0 - 7.5 K/uL    Immature Granulocytes # 0.3 K/uL    Lymphocytes Absolute 2.9 1.5 - 4.0 K/uL    Monocytes Absolute 0.9 (H) 0.2 - 0.8 K/uL    Eosinophils Absolute 0.1 0.0 - 0.4 K/uL    Basophils Absolute 0.0 0.0 - 0.1 K/uL   Comprehensive Metabolic Panel w/ Reflex to MG   Result Value Ref Range    Sodium 137 136 - 145 mmol/L    Potassium reflex Magnesium 3.7 3.4 - 5.1 mmol/L    Chloride 100 98 - 107 mmol/L    CO2 26 20 - 30 mmol/L    Anion Gap 11 3 - 16    Glucose 99 74 - 106 mg/dL    BUN 11 6 - 20 mg/dL    CREATININE 1.0 0.4 - 1.2 mg/dL    GFR Non-African American >59 >59    GFR African American >59 >59    Calcium 8.7 8.5 - 10.5 mg/dL    Total Protein 6.6 6.4 - 8.3 g/dL    Albumin 3.8 3.4 - 4.8 g/dL    Albumin/Globulin Ratio 1.4 0.8 - 2.0    Total Bilirubin 0.3 0.3 - 1.2 mg/dL    Alkaline Phosphatase 80 25 - 100 U/L    ALT 23 4 - 36 U/L    AST 17 8 - 33 U/L    Globulin 2.8 Not Established g/dL   Troponin   Result Value Ref Range    Troponin <0.30 <0.30 ng/mL   Brain Natriuretic Peptide   Result Value Ref Range    Pro-BNP 17 0 - 900 pg/mL   Protime-INR   Result Value Ref Range    Protime 12.6 11.6 - 13.8 sec    INR 0.99 0.88 - 1.11   APTT   Result Value Ref Range aPTT 24.7 22.5 - 34.9 sec   D-Dimer, Quantitative   Result Value Ref Range    D-Dimer, Quant <0.27 0.00 - 0.60 ug/mL FEU   Lactate, Sepsis   Result Value Ref Range    Lactic Acid, Sepsis 1.8 0.4 - 1.9 mmol/L        All other labs were within normal range or not returned as of this dictation. EMERGENCY DEPARTMENT COURSE and DIFFERENTIAL DIAGNOSIS/MDM:   Vitals:    Vitals:    02/17/22 1636   BP: (!) 145/84   Pulse: 85   Resp: 16   Temp: 98.2 °F (36.8 °C)   TempSrc: Oral   SpO2: 99%   Weight: 177 lb (80.3 kg)       MEDICATIONS ADMINISTERED IN ED:  Medications   lactated ringers bolus (1,000 mLs IntraVENous New Bag 2/17/22 1835)       Patient is stable doing well O2 sats remained good 9899% blood work is all within normal limits chest x-ray to my reading is clear we will go ahead and discharge him to home as I have discussed with him that the only treatment at this point is symptomatic so he is encouraged to take Tylenol alternating with Motrin for his headache and his aches and pains and follow-up with his family physician next week. The patient will follow-up with their PCP in 1-2 days for reevaluation. If the patient or family members have anyfurther concerns or any worsening symptoms they will return to the ED for reevaluation. CONSULTS:  None    PROCEDURES:  Procedures    CRITICAL CARE TIME    Total Critical Care time was 0 minutes, excluding separately reportable procedures. There was a high probability of clinically significant/life threatening deterioration in the patient's condition which required my urgent intervention. FINAL IMPRESSION      1.  Upper respiratory tract infection due to COVID-19 virus Stable         DISPOSITION/PLAN   DISPOSITION    Stable discharge to home    [facility]  [phone]    Schedule an appointment as soon as possible for a visit in 1 day        DISCHARGE MEDICATIONS:  New Prescriptions    No medications on file       Comment: Please note this report has been produced using speech recognition software and may contain errorsrelated to that system including errors in grammar, punctuation, and spelling, as well as words and phrases that may be inappropriate. If there are any questions or concerns please feel free to contact the dictating providerfor clarification.     Patrick Chapa MD  Attending Emergency Physician              Patrick Chapa MD  02/17/22 9493

## 2022-02-18 NOTE — ED NOTES
Pt left ED ambulatory with belongings at this time. Pt verbalized understanding of discharge instructions.       Zenon Eastman RN  02/17/22 1741

## 2022-03-17 ENCOUNTER — HOSPITAL ENCOUNTER (OUTPATIENT)
Facility: HOSPITAL | Age: 37
Discharge: HOME | End: 2022-03-17

## 2022-03-17 PROCEDURE — 99001 SPECIMEN HANDLING PT-LAB: CPT

## 2022-09-26 ENCOUNTER — HOSPITAL ENCOUNTER (OUTPATIENT)
Dept: GENERAL RADIOLOGY | Facility: HOSPITAL | Age: 37
Discharge: HOME | End: 2022-09-26

## 2022-09-26 ENCOUNTER — HOSPITAL ENCOUNTER (OUTPATIENT)
Facility: HOSPITAL | Age: 37
Discharge: HOME | End: 2022-09-26

## 2022-09-26 DIAGNOSIS — M25.562 LEFT KNEE PAIN, UNSPECIFIED CHRONICITY: ICD-10-CM

## 2022-09-26 PROCEDURE — 73562 X-RAY EXAM OF KNEE 3: CPT

## 2023-05-08 ENCOUNTER — HOSPITAL ENCOUNTER (OUTPATIENT)
Dept: GENERAL RADIOLOGY | Facility: HOSPITAL | Age: 38
Discharge: HOME | End: 2023-05-08
Payer: COMMERCIAL

## 2023-05-08 ENCOUNTER — HOSPITAL ENCOUNTER (OUTPATIENT)
Facility: HOSPITAL | Age: 38
Discharge: HOME | End: 2023-05-08
Payer: COMMERCIAL

## 2023-05-08 DIAGNOSIS — M79.644 FINGER PAIN, RIGHT: ICD-10-CM

## 2023-05-08 PROCEDURE — 73140 X-RAY EXAM OF FINGER(S): CPT

## 2023-11-21 ENCOUNTER — HOSPITAL ENCOUNTER (OUTPATIENT)
Facility: HOSPITAL | Age: 38
Discharge: HOME | End: 2023-11-21
Payer: COMMERCIAL

## 2023-11-21 ENCOUNTER — HOSPITAL ENCOUNTER (OUTPATIENT)
Dept: GENERAL RADIOLOGY | Facility: HOSPITAL | Age: 38
Discharge: HOME | End: 2023-11-21
Payer: COMMERCIAL

## 2023-11-21 DIAGNOSIS — R05.3 PERSISTENT COUGH: ICD-10-CM

## 2023-11-21 PROCEDURE — 71046 X-RAY EXAM CHEST 2 VIEWS: CPT

## 2024-05-13 ENCOUNTER — PATIENT ROUNDING (BHMG ONLY) (OUTPATIENT)
Dept: UROLOGY | Facility: CLINIC | Age: 39
End: 2024-05-13
Payer: COMMERCIAL

## 2024-05-13 ENCOUNTER — OFFICE VISIT (OUTPATIENT)
Dept: UROLOGY | Facility: CLINIC | Age: 39
End: 2024-05-13
Payer: COMMERCIAL

## 2024-05-13 ENCOUNTER — LAB (OUTPATIENT)
Dept: LAB | Facility: HOSPITAL | Age: 39
End: 2024-05-13
Payer: COMMERCIAL

## 2024-05-13 VITALS
HEART RATE: 78 BPM | WEIGHT: 184.6 LBS | HEIGHT: 72 IN | DIASTOLIC BLOOD PRESSURE: 60 MMHG | BODY MASS INDEX: 25 KG/M2 | SYSTOLIC BLOOD PRESSURE: 106 MMHG | OXYGEN SATURATION: 97 % | TEMPERATURE: 98.4 F

## 2024-05-13 DIAGNOSIS — E29.1 HYPOGONADISM IN MALE: ICD-10-CM

## 2024-05-13 DIAGNOSIS — E29.1 HYPOGONADISM IN MALE: Primary | ICD-10-CM

## 2024-05-13 DIAGNOSIS — N42.9 DISORDER OF PROSTATE: ICD-10-CM

## 2024-05-13 PROBLEM — E16.2 HYPOGLYCEMIA: Status: ACTIVE | Noted: 2024-05-13

## 2024-05-13 PROBLEM — M25.569 KNEE PAIN: Status: ACTIVE | Noted: 2024-05-13

## 2024-05-13 LAB
HCT VFR BLD AUTO: 43.5 % (ref 37.5–51)
HGB BLD-MCNC: 14.9 G/DL (ref 13–17.7)
PSA SERPL-MCNC: 0.47 NG/ML (ref 0–4)

## 2024-05-13 PROCEDURE — 85018 HEMOGLOBIN: CPT

## 2024-05-13 PROCEDURE — 84402 ASSAY OF FREE TESTOSTERONE: CPT

## 2024-05-13 PROCEDURE — 85014 HEMATOCRIT: CPT

## 2024-05-13 PROCEDURE — 84153 ASSAY OF PSA TOTAL: CPT

## 2024-05-13 PROCEDURE — 36415 COLL VENOUS BLD VENIPUNCTURE: CPT

## 2024-05-13 PROCEDURE — 84403 ASSAY OF TOTAL TESTOSTERONE: CPT

## 2024-05-13 RX ORDER — SYRINGE W-NEEDLE,DISPOSAB,3 ML 25GX5/8"
100 SYRINGE, EMPTY DISPOSABLE MISCELLANEOUS
Qty: 50 EACH | Refills: 0 | Status: SHIPPED | OUTPATIENT
Start: 2024-05-13

## 2024-05-13 RX ORDER — SILDENAFIL 25 MG/1
25 TABLET, FILM COATED ORAL DAILY PRN
COMMUNITY

## 2024-05-13 RX ORDER — PRAVASTATIN SODIUM 20 MG
20 TABLET ORAL DAILY
COMMUNITY

## 2024-05-13 RX ORDER — TESTOSTERONE CYPIONATE 200 MG/ML
100 INJECTION, SOLUTION INTRAMUSCULAR WEEKLY
Qty: 4 ML | Refills: 0 | Status: SHIPPED | OUTPATIENT
Start: 2024-05-13

## 2024-05-13 NOTE — PROGRESS NOTES
Office Visit Low Testosterone      Patient Name: Nicholas Oviedo  : 1985   MRN: [id number]     Chief Complaint:   Chief Complaint   Patient presents with    [facility] Care     Low testosterone         Referring Provider: Galilea Pulido, *    History of Present Illness: Nicholas Oviedo is a 38 y.o. male who presents today with low testosterone.  Patient has history of HLD, insomnia, anxiety, and depression.  He has not taken testosterone in the past.   The serum test was collected due to the following complaints:     Reports low libido     Reports low energy     Reports poor sleep     Denies mental clarity issues    Reports history of depression    Reports erectile dysfunction, reports 50-60% of the time he does not have erection significant for penetration.  He does take viagra as needed and feels this is effective  Denies breast tenderness or growth   Denies history of blood clots               Denies history of stroke                       Patient does not have potential interest in conception-has had vasectomy    Objective      Review of System:   As noted in HPI.    Past Medical History:   Past Medical History:   Diagnosis Date    Arthralgia of right knee     Depression     Effusion of right knee     Hand injury     Hyperactivity     Hypoglycemia     Impaired fasting glucose     Suicide attempt        Past Surgical History:   Past Surgical History:   Procedure Laterality Date    HAND SURGERY      TONSILLECTOMY AND ADENOIDECTOMY      VASECTOMY         Family History:   Family History   Problem Relation Age of Onset    Stroke Mother     Diabetes Mother     Hyperlipidemia Father     Hypertension Father     Cancer Father         Lung, Thyroid    Heart attack Father     Migraines Sister     Stroke Other         Stroke    Diabetes Other        Social History:   Social History     Socioeconomic History    Marital status: Significant Other   Tobacco Use    Smoking status: Never    Smokeless tobacco:  "Current     Types: Snuff   Vaping Use    Vaping status: Never Used   Substance and Sexual Activity    Alcohol use: Yes     Comment: RARELY    Drug use: No    Sexual activity: Yes     Partners: Female     Birth control/protection: Other       Medications:     Current Outpatient Medications:     acetaminophen (Tylenol 8 Hour) 650 MG 8 hr tablet, Take 1 tablet by mouth Every 8 (Eight) Hours As Needed for Mild Pain ., Disp: 12 tablet, Rfl: 0    FLUoxetine (PROzac) 20 MG capsule, Take 1 capsule by mouth Daily. Indications: Depression, Disp: , Rfl:     ibuprofen (ADVIL,MOTRIN) 600 MG tablet, Take 1 tablet by mouth Every 8 (Eight) Hours As Needed for Mild Pain ., Disp: 12 tablet, Rfl: 0    pravastatin (PRAVACHOL) 20 MG tablet, Take 1 tablet by mouth Daily., Disp: , Rfl:     Needle, Disp, 18G X 1-1/2\" misc, Use for drawing up the medication, Disp: 50 each, Rfl: 3    sildenafil (VIAGRA) 25 MG tablet, Take 1 tablet by mouth Daily As Needed for Erectile Dysfunction., Disp: , Rfl:     Syringe 23G X 1\" 3 ML misc, Use 100 mL Every 7 (Seven) Days., Disp: 50 each, Rfl: 0    Testosterone Cypionate (DEPOTESTOTERONE CYPIONATE) 200 MG/ML injection, Inject 0.5 mL into the appropriate muscle as directed by prescriber 1 (One) Time Per Week. Inject 0.5 mL into your muscle once a week.  Waste the remaining 0.5 mL, Disp: 4 mL, Rfl: 0    Allergies:   No Known Allergies    Objective     Physical Exam:   Vital Signs:   Vitals:    05/13/24 0912   BP: 106/60   Pulse: 78   Temp: 98.4 °F (36.9 °C)   SpO2: 97%   Weight: 83.7 kg (184 lb 9.6 oz)   Height: 183 cm (72.05\")     Body mass index is 25 kg/m².   Physical Exam  Vitals and nursing note reviewed.   Constitutional:       General: He is awake. He is not in acute distress.     Appearance: He is not ill-appearing.   Pulmonary:      Effort: Pulmonary effort is normal.   Skin:     General: Skin is warm and dry.   Neurological:      Mental Status: He is alert and oriented to person, place, and time. " "Mental status is at baseline.   Psychiatric:         Mood and Affect: Mood normal.         Behavior: Behavior is cooperative.            Labs  No results found for: \"TESTOSTERONE\", \"TESTFRE\"    No results found for: \"PSA\"    Lab Results   Component Value Date    WBC 10.19 2018    HGB 11.0 (L) 2018    HCT 33.1 (L) 2018    MCV 81.5 2018     2018                       I have reviewed the above labs.    Assessment / Plan    Assessment  Mr. Oviedo is a 38 y.o. male with low testosterone.  Patient has had 2 labs drawn in the morning showing the followin24 free T 8, total T 297  24 free T 3.3, total T 240, Hgb 15, HCT 43    Today we discussed the role testosterone plays for a male patient. I have indicated that low testosterone can be associated with metabolic syndrome, erectile dysfunction, coronary artery disease, diabetes, depression, osteoporosis, fatigue, low sex drive, poor sleep, high cholesterol, increased abdominal fat, muscle loss, irritability, hot flashes, and inability to concentrate.     Treatment options were discussed including SERMs, aromatase inhibitors, topical gel or patch, oral troches, nasal spray, testosterone injections as frequent as weekly, long-acting injections every 10 weeks, and testosterone pellets placed in clinic every 4-6 months.    We discussed testosterone therapy is a life long therapy and compliance with labs and visits are required for refills and patient safety.  Educated patient will get 1 month supply and will call for refills.  Patient given TRT policy and agreed to follow policy.  We discussed the decreased fertility risk with proceeding with testosterone.  Patient has had a vasectomy and does not desire anymore children.     I explained the risks, benefits, and alternatives to testosterone therapies. I informed him of the potential SEs of chest and leg swelling, increased acne, change in mood, polycythemia, and worsening of " undiagnosed prostate cancer.  Discussed treatment options for polycythemia.     Patient wishes to try testosterone cypionate 100 mg weekly and demonstrated understanding of drawing up medication, wasting medication, and proper injecting technique.      Plan  He wishes to proceed with a trial of testosterone cypionate 100 mg weekly.  Hgb/Hct, testosterone free and total in 3 months  PSA today  Follow up in 3 months with labs prior, video visit.     Follow Up:   Return in about 3 months (around 8/13/2024) for recheck with Sarahi, labs prior, Video visit on a Monday morning.      GONZALO Tran  [facility] [place]

## 2024-05-19 LAB
TESTOST FREE SERPL-MCNC: 3.3 PG/ML (ref 8.7–25.1)
TESTOST SERPL-MCNC: 218 NG/DL (ref 264–916)

## 2024-05-19 NOTE — PROGRESS NOTES
May 18, 2024    Hello, may I speak with Nicholas Oviedo? Left patient a voicemail.    My name is  Cathryn.     I am  with [facility]  [address]  [address]  [facility] [address] [phone].    Before we get started may I verify your date of birth? 1985    I am calling to officially welcome you to our practice and ask about your recent visit. Is this a good time to talk?     Tell me about your visit with us. What things went well?         We're always looking for ways to make our patients' experiences even better. Do you have recommendations on ways we may improve?      Overall were you satisfied with your first visit to our practice?        I appreciate you taking the time to speak with me today. Is there anything else I can do for you?       Thank you, and have a great day.

## 2024-06-13 ENCOUNTER — TELEPHONE (OUTPATIENT)
Dept: UROLOGY | Facility: CLINIC | Age: 39
End: 2024-06-13

## 2024-06-13 DIAGNOSIS — E29.1 HYPOGONADISM IN MALE: ICD-10-CM

## 2024-06-13 RX ORDER — TESTOSTERONE CYPIONATE 200 MG/ML
100 INJECTION, SOLUTION INTRAMUSCULAR WEEKLY
Qty: 4 ML | Refills: 0 | Status: SHIPPED | OUTPATIENT
Start: 2024-06-13

## 2024-06-13 NOTE — TELEPHONE ENCOUNTER
Caller: Nicholas Oviedo    Relationship to patient: Self    Best call back number: [phone]    Chief complaint: PT HAS FINISHED TESTOSTERONE AD WAS TOLD TO CALL IN AND GET APPT SCHEDULED FOR TESTING    Type of visit: 4 WEEK FOLLOW UP    Requested date: ASAP UNLESS MEDS CAN BE PRESCRIBED FOR 1 MORE WEEK UNTIL APPT    If rescheduling, when is the original appointment: 6/26/24 9:40A    Additional notes:PT IS COMPLETELY OUT OF MEDICATION     OK TO LEAVE A VM

## 2024-06-13 NOTE — TELEPHONE ENCOUNTER
I am not sure why patient has a sooner follow up with me.  He doesn't need an appointment until August with labs 1 week prior.  He needs to call when he has used his last dose which he will request every month as this is a controlled substance.  I have sent in a refill.

## 2024-06-13 NOTE — TELEPHONE ENCOUNTER
LVM that refill has been sent and I advised he did not need the 6/24 appointment but to keep the 8/12 appoint for his 3 month compliance.

## 2024-09-05 ENCOUNTER — LAB (OUTPATIENT)
Dept: LAB | Facility: HOSPITAL | Age: 39
End: 2024-09-05
Payer: COMMERCIAL

## 2024-09-05 PROCEDURE — 84402 ASSAY OF FREE TESTOSTERONE: CPT | Performed by: NURSE PRACTITIONER

## 2024-09-05 PROCEDURE — 85018 HEMOGLOBIN: CPT | Performed by: NURSE PRACTITIONER

## 2024-09-05 PROCEDURE — 84403 ASSAY OF TOTAL TESTOSTERONE: CPT | Performed by: NURSE PRACTITIONER

## 2024-09-05 PROCEDURE — 85014 HEMATOCRIT: CPT | Performed by: NURSE PRACTITIONER

## 2024-09-16 ENCOUNTER — OFFICE VISIT (OUTPATIENT)
Dept: UROLOGY | Facility: CLINIC | Age: 39
End: 2024-09-16
Payer: COMMERCIAL

## 2024-09-16 VITALS
BODY MASS INDEX: 24.92 KG/M2 | DIASTOLIC BLOOD PRESSURE: 74 MMHG | SYSTOLIC BLOOD PRESSURE: 116 MMHG | HEIGHT: 72 IN | HEART RATE: 76 BPM | WEIGHT: 184 LBS | TEMPERATURE: 98.7 F | OXYGEN SATURATION: 96 %

## 2024-09-16 DIAGNOSIS — E29.1 HYPOGONADISM IN MALE: Primary | ICD-10-CM

## 2024-09-16 PROCEDURE — 99214 OFFICE O/P EST MOD 30 MIN: CPT | Performed by: NURSE PRACTITIONER

## 2024-09-23 DIAGNOSIS — E29.1 HYPOGONADISM IN MALE: ICD-10-CM

## 2024-09-23 RX ORDER — TESTOSTERONE CYPIONATE 200 MG/ML
INJECTION, SOLUTION INTRAMUSCULAR
Qty: 4 ML | Refills: 0 | Status: SHIPPED | OUTPATIENT
Start: 2024-09-23

## 2024-10-31 DIAGNOSIS — E29.1 HYPOGONADISM IN MALE: ICD-10-CM

## 2024-10-31 RX ORDER — TESTOSTERONE CYPIONATE 200 MG/ML
INJECTION, SOLUTION INTRAMUSCULAR
Qty: 4 ML | Refills: 0 | Status: SHIPPED | OUTPATIENT
Start: 2024-10-31

## 2024-12-07 ENCOUNTER — LAB (OUTPATIENT)
Dept: LAB | Facility: HOSPITAL | Age: 39
End: 2024-12-07
Payer: COMMERCIAL

## 2024-12-07 DIAGNOSIS — E29.1 HYPOGONADISM IN MALE: ICD-10-CM

## 2024-12-07 LAB
ESTRADIOL SERPL HS-MCNC: 28.1 PG/ML
HCT VFR BLD AUTO: 48.9 % (ref 37.5–51)
HGB BLD-MCNC: 16.6 G/DL (ref 13–17.7)
LH SERPL-ACNC: <0.3 MIU/ML
PROLACTIN SERPL-MCNC: 7.92 NG/ML (ref 4.04–15.2)

## 2024-12-07 PROCEDURE — 36415 COLL VENOUS BLD VENIPUNCTURE: CPT

## 2024-12-07 PROCEDURE — 85014 HEMATOCRIT: CPT

## 2024-12-07 PROCEDURE — 85018 HEMOGLOBIN: CPT

## 2024-12-07 PROCEDURE — 83002 ASSAY OF GONADOTROPIN (LH): CPT

## 2024-12-07 PROCEDURE — 82670 ASSAY OF TOTAL ESTRADIOL: CPT

## 2024-12-07 PROCEDURE — 84146 ASSAY OF PROLACTIN: CPT

## 2024-12-07 PROCEDURE — 84403 ASSAY OF TOTAL TESTOSTERONE: CPT

## 2024-12-07 PROCEDURE — 84402 ASSAY OF FREE TESTOSTERONE: CPT

## 2024-12-10 LAB
TESTOST FREE SERPL-MCNC: 7.5 PG/ML (ref 8.7–25.1)
TESTOST SERPL-MCNC: 377 NG/DL (ref 264–916)

## 2024-12-17 ENCOUNTER — TELEMEDICINE (OUTPATIENT)
Dept: UROLOGY | Facility: CLINIC | Age: 39
End: 2024-12-17
Payer: COMMERCIAL

## 2024-12-17 DIAGNOSIS — E29.1 HYPOGONADISM IN MALE: Primary | ICD-10-CM

## 2024-12-17 DIAGNOSIS — N52.9 ERECTILE DYSFUNCTION, UNSPECIFIED ERECTILE DYSFUNCTION TYPE: ICD-10-CM

## 2024-12-17 RX ORDER — TESTOSTERONE CYPIONATE 200 MG/ML
200 INJECTION, SOLUTION INTRAMUSCULAR WEEKLY
Qty: 4 ML | Refills: 0 | Status: SHIPPED | OUTPATIENT
Start: 2024-12-17

## 2024-12-17 RX ORDER — SILDENAFIL 100 MG/1
50 TABLET, FILM COATED ORAL DAILY PRN
Qty: 30 TABLET | Refills: 1 | Status: SHIPPED | OUTPATIENT
Start: 2024-12-17

## 2024-12-17 NOTE — PROGRESS NOTES
Office Visit Follow Up      Patient Name: Nicholas Oviedo  : 1985   MRN: [id number]     Chief Complaint:   Chief Complaint   Patient presents with    Hypogonadism       Referring Provider: No ref. provider found    History of Present Illness: Nicholas Oviedo is a 39 y.o. male who presents today for follow up with hypogonadism.  Currently on  mg IM on  and Thursday.  Patient reports some improvement in symptoms including fatigue, more energy.  He contributes this to 14-18 hour shifts 4-5x times a week.  He has good libido.  He still suffers from ED, unable to maintain erection.  He is taking sildenafil 50 mg PO PRN on empty stomach, no ETOH.  He is satisfied with his erection on sildenafil.      Typically injects  and Thursday, 0.5 mL.  Labs obtained Saturday with last injection .  He was told by someone in the office to hold his Thursday injection.      Video visit conducted using video and audio.    Location of patient: Parked in his car in [place], [place]  Location of provider: [facility] in [place], [place]  Patient has chosen to receive care through a telehealth visit.  The patient has signed the video visit consent form.  No technical issues occurred during this visit.      Subjective      Review of System:   As noted in HPI      Past Medical History:   Past Medical History:   Diagnosis Date    Arthralgia of right knee     Depression     Effusion of right knee     Hand injury     Hyperactivity     Hypoglycemia     Impaired fasting glucose     Suicide attempt        Past Surgical History:   Past Surgical History:   Procedure Laterality Date    HAND SURGERY      TONSILLECTOMY AND ADENOIDECTOMY      VASECTOMY         Family History:   Family History   Problem Relation Age of Onset    Stroke Mother     Diabetes Mother     Hyperlipidemia Father     Hypertension Father     Cancer Father         Lung, Thyroid    Heart attack Father     Migraines Sister     Stroke Other          "Stroke    Diabetes Other        Medications:     Current Outpatient Medications:     FLUoxetine (PROzac) 20 MG capsule, Take 1 capsule by mouth Daily. Indications: Depression, Disp: , Rfl:     ibuprofen (ADVIL,MOTRIN) 600 MG tablet, Take 1 tablet by mouth Every 8 (Eight) Hours As Needed for Mild Pain ., Disp: 12 tablet, Rfl: 0    Needle, Disp, 18G X 1-1/2\" misc, Use for drawing up the medication, Disp: 50 each, Rfl: 3    pravastatin (PRAVACHOL) 20 MG tablet, Take 1 tablet by mouth Daily., Disp: , Rfl:     sildenafil (VIAGRA) 100 MG tablet, Take 0.5 tablets by mouth Daily As Needed for Erectile Dysfunction. Take 1 hour prior to intercourse on empty stomach as needed, Disp: 30 tablet, Rfl: 1    Syringe 23G X 1\" 3 ML misc, Use 100 mL Every 7 (Seven) Days., Disp: 50 each, Rfl: 0    Testosterone Cypionate (DEPOTESTOTERONE CYPIONATE) 200 MG/ML injection, Inject 1 mL into the appropriate muscle as directed by prescriber 1 (One) Time Per Week., Disp: 4 mL, Rfl: 0    Allergies:   No Known Allergies    Objective     Physical Exam:   Vital Signs: There were no vitals filed for this visit.  There is no height or weight on file to calculate BMI.     Physical Exam  Constitutional:       General: He is awake. He is not in acute distress.  Pulmonary:      Effort: Pulmonary effort is normal.   Neurological:      Mental Status: He is alert and oriented to person, place, and time. Mental status is at baseline.   Psychiatric:         Attention and Perception: Attention normal.         Mood and Affect: Mood normal.         Speech: Speech normal.         Behavior: Behavior normal. Behavior is cooperative.         Thought Content: Thought content normal.         Cognition and Memory: Cognition normal.         Judgment: Judgment normal.          Labs:   Brief Urine Lab Results       None                 Lab Results   Component Value Date    TESTOSTERONE FREE 7.5 (L) 12/07/2024    TESTOSTERONE FREE 2.7 (L) 09/05/2024    TESTOSTERONE FREE " 3.3 (L) 05/13/2024    TESTOSTERONE, TOTAL 377 12/07/2024    TESTOSTERONE, TOTAL 155 (L) 09/05/2024    TESTOSTERONE, TOTAL 218 (L) 05/13/2024       Lab Results   Component Value Date    PSA 0.473 05/13/2024       Lab Results   Component Value Date    WBC 6.8 02/17/2022    HGB 16.6 12/07/2024    HCT 48.9 12/07/2024    MCV 90.7 02/17/2022     02/17/2022         I have reviewed the above labs.  Assessment / Plan      Assessment:   Diagnoses and all orders for this visit:    1. Hypogonadism in male (Primary)  -     Testosterone Cypionate (DEPOTESTOTERONE CYPIONATE) 200 MG/ML injection; Inject 1 mL into the appropriate muscle as directed by prescriber 1 (One) Time Per Week.  Dispense: 4 mL; Refill: 0  -     Testosterone; Future  -     Hemoglobin & Hematocrit, Blood; Future    2. Erectile dysfunction, unspecified erectile dysfunction type  -     sildenafil (VIAGRA) 100 MG tablet; Take 0.5 tablets by mouth Daily As Needed for Erectile Dysfunction. Take 1 hour prior to intercourse on empty stomach as needed  Dispense: 30 tablet; Refill: 1         39 y.o. male presents today for follow up with hypogonadism.  Patient reports some improvement in symptoms including fatigue, more energy.  He contributes this to 14-18 hour shifts 4-5x times a week.  He has good libido.  He still suffers from ED, unable to maintain erection.  He is taking sildenafil 50 mg PO PRN on empty stomach, no ETOH.  He is satisfied with his erection on sildenafil.        Typically injects Sunday and Thursday, 0.5 mL.  Labs obtained Saturday with last injection Sunday.  He was told by someone in the office to hold his Thursday injection.  Improvement in TT even with labs obtained 6 days after injection.  Discussed rechecking labs at appropriate time interval in 1 month to make sure we are adequately dosing.  At this time we will continue current dose of  mg IM biweekly with repeat Hgb/Hct and TT in 1 month.  Hct <50 with normal estradiol.  Other  labs appropriate with TRT.  Inquired about HCG and discussed this is for fertility and not AUA guidelines for TRT and patient is not interested in fertility.      Labs obtained 12/07/24 at 0925  Total Testosterone: 377  Free Testosterone: 7.5  LH: <0.30  Estradiol:  28.1  Hematocrit:  48.9  Hemoglobin:  16.6  Prolactin: 7.92    Plan:    Continue  mg IM biweekly on Sunday and Thursday  Continue sildenafil 50 mg PO daily PRN.  Discussed we will send in 100 mg tablets and to break in half.    TT and Hgb/Hct in 1 month  Will need to repeat in 6 months prior to his follow up    Follow Up:   Return in about 6 months (around 6/17/2025) for recheck with Sarahi, labs prior.    GONZALO Tran  [facility]

## 2025-04-11 ENCOUNTER — HOSPITAL ENCOUNTER (OUTPATIENT)
Facility: HOSPITAL | Age: 40
Discharge: HOME | End: 2025-04-11

## 2025-04-11 LAB
ERYTHROCYTE [DISTWIDTH] IN BLOOD BY AUTOMATED COUNT: 13.2 % (ref 11–16)
ESTRADIOL SERPL-MCNC: 79 PG/ML
HCT VFR BLD AUTO: 46.6 % (ref 40–54)
HGB BLD-MCNC: 15.9 G/DL (ref 13–18)
MCH RBC QN AUTO: 32.3 PG (ref 27–32)
MCHC RBC AUTO-ENTMCNC: 34.1 G/DL (ref 31–35)
MCV RBC AUTO: 94.5 FL (ref 80–100)
PLATELET # BLD AUTO: 289 K/UL (ref 150–400)
PMV BLD AUTO: 8.8 FL (ref 6–10)
RBC # BLD AUTO: 4.93 M/UL (ref 4.5–6)
WBC # BLD AUTO: 9.8 K/UL (ref 4–11)

## 2025-04-11 PROCEDURE — 84270 ASSAY OF SEX HORMONE GLOBUL: CPT

## 2025-04-11 PROCEDURE — 85027 COMPLETE CBC AUTOMATED: CPT

## 2025-04-11 PROCEDURE — 82670 ASSAY OF TOTAL ESTRADIOL: CPT

## 2025-04-11 PROCEDURE — 84403 ASSAY OF TOTAL TESTOSTERONE: CPT

## 2025-04-11 PROCEDURE — 36415 COLL VENOUS BLD VENIPUNCTURE: CPT

## 2025-04-15 LAB
SHBG SERPL-SCNC: 15 NMOL/L (ref 17–56)
TESTOST FREE SERPL-MCNC: 405.5 PG/ML (ref 47–244)
TESTOST SERPL-MCNC: 1336 NG/DL (ref 249–836)

## 2025-06-09 ENCOUNTER — TELEPHONE (OUTPATIENT)
Dept: UROLOGY | Facility: CLINIC | Age: 40
End: 2025-06-09

## 2025-06-09 NOTE — TELEPHONE ENCOUNTER
Provider will be out of the office on 6/20/2025.  Unable to reach the patient or lvm  Hub can read and schedule the next available opening.